# Patient Record
Sex: FEMALE | Race: WHITE | NOT HISPANIC OR LATINO | URBAN - METROPOLITAN AREA
[De-identification: names, ages, dates, MRNs, and addresses within clinical notes are randomized per-mention and may not be internally consistent; named-entity substitution may affect disease eponyms.]

---
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
## 2019-03-25 NOTE — ED PROVIDER NOTE - CONSTITUTIONAL, MLM
-- Message is from the [facility]--    Reason for Call: The patient visiting nurse would li\ke to report that the patient fell on 3/24/19 the patient has a small abrasion to the left knee and is complaining of abdominal pain. She did not find any bruising in the abdominal area. No loss of consciousness, vital signs are stabled right now.    Caller Information       Type Contact Phone    03/25/2019 04:41 PM Phone (Incoming) Jessie (Nurse) [phone]          Alternative phone number:     Turnaround time given to caller:   \"This message will be sent to [state Provider's name]. The clinical team will fulfill your request as soon as they review your message.\"     normal... Well appearing, well nourished, awake, alert, oriented to person, place, time/situation and in no apparent distress.

## 2019-10-17 ENCOUNTER — APPOINTMENT (OUTPATIENT)
Dept: OBGYN | Facility: CLINIC | Age: 19
End: 2019-10-17
Payer: MEDICAID

## 2019-10-17 PROCEDURE — 0502F SUBSEQUENT PRENATAL CARE: CPT

## 2019-11-11 ENCOUNTER — APPOINTMENT (OUTPATIENT)
Dept: OBGYN | Facility: CLINIC | Age: 19
End: 2019-11-11
Payer: MEDICAID

## 2019-11-11 PROCEDURE — 0502F SUBSEQUENT PRENATAL CARE: CPT

## 2019-11-21 ENCOUNTER — APPOINTMENT (OUTPATIENT)
Dept: ANTEPARTUM | Facility: CLINIC | Age: 19
End: 2019-11-21
Payer: MEDICAID

## 2019-11-21 PROCEDURE — 76819 FETAL BIOPHYS PROFIL W/O NST: CPT

## 2019-11-21 PROCEDURE — 76816 OB US FOLLOW-UP PER FETUS: CPT

## 2019-12-05 ENCOUNTER — APPOINTMENT (OUTPATIENT)
Dept: OBGYN | Facility: CLINIC | Age: 19
End: 2019-12-05
Payer: MEDICAID

## 2019-12-05 PROCEDURE — 0502F SUBSEQUENT PRENATAL CARE: CPT

## 2019-12-19 ENCOUNTER — APPOINTMENT (OUTPATIENT)
Dept: OBGYN | Facility: CLINIC | Age: 19
End: 2019-12-19
Payer: MEDICAID

## 2019-12-19 PROCEDURE — 0502F SUBSEQUENT PRENATAL CARE: CPT

## 2020-01-02 ENCOUNTER — APPOINTMENT (OUTPATIENT)
Dept: OBGYN | Facility: CLINIC | Age: 20
End: 2020-01-02
Payer: MEDICAID

## 2020-01-02 PROCEDURE — 0502F SUBSEQUENT PRENATAL CARE: CPT

## 2020-01-09 ENCOUNTER — APPOINTMENT (OUTPATIENT)
Dept: OBGYN | Facility: CLINIC | Age: 20
End: 2020-01-09

## 2020-01-09 ENCOUNTER — APPOINTMENT (OUTPATIENT)
Dept: OBGYN | Facility: CLINIC | Age: 20
End: 2020-01-09
Payer: MEDICAID

## 2020-01-09 PROCEDURE — 0502F SUBSEQUENT PRENATAL CARE: CPT

## 2020-01-13 ENCOUNTER — APPOINTMENT (OUTPATIENT)
Dept: ANTEPARTUM | Facility: CLINIC | Age: 20
End: 2020-01-13

## 2020-01-21 ENCOUNTER — APPOINTMENT (OUTPATIENT)
Dept: ANTEPARTUM | Facility: CLINIC | Age: 20
End: 2020-01-21
Payer: MEDICAID

## 2020-01-21 PROCEDURE — 76816 OB US FOLLOW-UP PER FETUS: CPT

## 2020-01-21 PROCEDURE — 76819 FETAL BIOPHYS PROFIL W/O NST: CPT

## 2020-01-28 ENCOUNTER — APPOINTMENT (OUTPATIENT)
Dept: OBGYN | Facility: CLINIC | Age: 20
End: 2020-01-28

## 2020-02-03 ENCOUNTER — APPOINTMENT (OUTPATIENT)
Dept: ANTEPARTUM | Facility: CLINIC | Age: 20
End: 2020-02-03
Payer: MEDICAID

## 2020-02-03 PROCEDURE — 76816 OB US FOLLOW-UP PER FETUS: CPT

## 2020-02-03 PROCEDURE — 76819 FETAL BIOPHYS PROFIL W/O NST: CPT

## 2020-02-03 PROCEDURE — 76820 UMBILICAL ARTERY ECHO: CPT

## 2020-02-05 ENCOUNTER — OUTPATIENT (OUTPATIENT)
Dept: INPATIENT UNIT | Facility: HOSPITAL | Age: 20
LOS: 1 days | Discharge: HOME | End: 2020-02-05
Payer: MEDICAID

## 2020-02-05 VITALS
TEMPERATURE: 98 F | DIASTOLIC BLOOD PRESSURE: 61 MMHG | HEART RATE: 88 BPM | RESPIRATION RATE: 16 BRPM | SYSTOLIC BLOOD PRESSURE: 94 MMHG

## 2020-02-05 VITALS — HEIGHT: 69 IN | WEIGHT: 171.96 LBS | TEMPERATURE: 98 F

## 2020-02-05 DIAGNOSIS — Z3A.00 WEEKS OF GESTATION OF PREGNANCY NOT SPECIFIED: ICD-10-CM

## 2020-02-05 DIAGNOSIS — O26.899 OTHER SPECIFIED PREGNANCY RELATED CONDITIONS, UNSPECIFIED TRIMESTER: ICD-10-CM

## 2020-02-05 PROCEDURE — 99203 OFFICE O/P NEW LOW 30 MIN: CPT

## 2020-02-05 PROCEDURE — 76818 FETAL BIOPHYS PROFILE W/NST: CPT | Mod: 26

## 2020-02-05 NOTE — OB PROVIDER TRIAGE NOTE - NSHPPHYSICALEXAM_GEN_ALL_CORE
20 y/o  female, para 0000 @ 41+4 wks gestation, single IUP  Early labor  Gen: NAD; A+O x3  Cardiac: R/R/R  Pulm: CTAB  Abdomen: Gravid, soft between ctx, non-tender, mild contractions palpated  VS--BP: 94/61, P 88, RR 18, T 36.8, Pain 5-6/10-Pt declines any pain meds at this time  Cat 1 tracin bpm, moderate variability, +accels, no decels  [place]: q 2-3 min.  SVE: 1.5/50/-3, intact membranes  GBS negative  Clinical EFW 3700g 20 y/o  female, para 0000 @ 41+4 wks gestation, single IUP  Early labor  Gen: NAD; A+O x3  Cardiac: R/R/R  Pulm: CTAB  Abdomen: Gravid, soft between ctx, non-tender, mild contractions palpated  VS--BP: 94/61, P 88, RR 18, T 36.8, Pain 5-6/10-Pt declines any pain meds at this time  Cat 1 tracin bpm, moderate variability, +accels, no decels  [place]: q 2-3 min.  SVE: 1.5/50/-3, intact membranes  GBS negative  Clinical EFW 3700g  Limited TAS: Cephalic, posterior placenta, BPP 8/8, MICHAEL 10.5cm

## 2020-02-05 NOTE — OB PROVIDER TRIAGE NOTE - ADDITIONAL INSTRUCTIONS
Patient to return tomorrow 2/6/20 for scheduled induction of labor  Continue fetal kick counts  Return to triage for: decreased fetal movement, leakage of fluid, vaginal bleeding, increase in contraction frequency or intensity, or for any other concerns.

## 2020-02-05 NOTE — OB PROVIDER TRIAGE NOTE - HISTORY OF PRESENT ILLNESS
Patient of Dr Crandall  20 y/o  female, para 0000 @ 41+4 wks gestation, single IUP  Presents with c/o loss of mucus plug yesterday morning with dark brown/ pinkish discharge and irregular contractions q 8 min since .   Pt endorses strong fetal movement, denies any leakage of fluid or active vaginal bleeding. Pt denies any A/P complications, GBS negative.    Allergies: NKDA  Meds: Prenatal vitamins  PMH: Scoliosis unknown degree of curvature, pt denies outpatient anesthesia consult.   PSH: Denies  OBgynHx    Pt denies any h/o: Ovarian cysts, fibroids, breast problems, STDs/STIs  PSY: Denies  EtOH/smoke/recreational substance use: denies  FH: Not relevant to HPI  H/W/BMI: 69"/172#/25.4

## 2020-02-05 NOTE — OB PROVIDER TRIAGE NOTE - PLAN OF CARE
Patient cleared to be discharge home as per Dr Field  Continue fetal kick counts  Return to triage for: decreased fetal movement, leakage of fluid, vaginal bleeding, increase in contraction frequency or intensity, or for any other concerns.   Labor warning signs reviewed with patient. Patient verbalized understanding.

## 2020-02-05 NOTE — OB RN TRIAGE NOTE - NSNURSINGINSTR_OBGYN_ALL_OB_FT
pt evaluated for labor, no evidence of active labor.   pt d/c to home with written and verbal instructions given by isabel levine.

## 2020-02-05 NOTE — OB PROVIDER TRIAGE NOTE - NSOBPROVIDERNOTE_OBGYN_ALL_OB_FT
Dr Field notified of above findings  Pt cleared to be discharge home as per Dr Field  Pt to keep scheduled induction for tomorrow 2/6/20 unless delivered.

## 2020-02-05 NOTE — OB PROVIDER TRIAGE NOTE - NSHPLABSRESULTS_GEN_ALL_CORE
Vital Signs Last 24 Hrs  T(C): 36.8 (05 Feb 2020 00:47), Max: 36.8 (05 Feb 2020 00:40)  T(F): 98.24 (05 Feb 2020 00:47), Max: 98.24 (05 Feb 2020 00:47)  HR: 88 (05 Feb 2020 00:46) (88 - 88)  BP: 94/61 (05 Feb 2020 00:46) (94/61 - 94/61)  BP(mean): --  RR: 16 (05 Feb 2020 00:40) (16 - 16)  SpO2: --

## 2020-02-06 ENCOUNTER — TRANSCRIPTION ENCOUNTER (OUTPATIENT)
Age: 20
End: 2020-02-06

## 2020-02-06 ENCOUNTER — INPATIENT (INPATIENT)
Facility: HOSPITAL | Age: 20
LOS: 1 days | Discharge: HOME | End: 2020-02-08
Attending: OBSTETRICS & GYNECOLOGY | Admitting: OBSTETRICS & GYNECOLOGY
Payer: MEDICAID

## 2020-02-06 VITALS
HEART RATE: 89 BPM | RESPIRATION RATE: 18 BRPM | DIASTOLIC BLOOD PRESSURE: 58 MMHG | SYSTOLIC BLOOD PRESSURE: 138 MMHG | TEMPERATURE: 99 F

## 2020-02-06 DIAGNOSIS — O26.899 OTHER SPECIFIED PREGNANCY RELATED CONDITIONS, UNSPECIFIED TRIMESTER: ICD-10-CM

## 2020-02-06 DIAGNOSIS — Z3A.00 WEEKS OF GESTATION OF PREGNANCY NOT SPECIFIED: ICD-10-CM

## 2020-02-06 PROBLEM — M41.9 SCOLIOSIS, UNSPECIFIED: Chronic | Status: ACTIVE | Noted: 2020-02-05

## 2020-02-06 LAB
BASOPHILS # BLD AUTO: 0.05 K/UL — SIGNIFICANT CHANGE UP (ref 0–0.2)
BASOPHILS NFR BLD AUTO: 0.3 % — SIGNIFICANT CHANGE UP (ref 0–2)
BLD GP AB SCN SERPL QL: NEGATIVE — SIGNIFICANT CHANGE UP
EOSINOPHIL # BLD AUTO: 0.07 K/UL — SIGNIFICANT CHANGE UP (ref 0–0.5)
EOSINOPHIL NFR BLD AUTO: 0.5 % — SIGNIFICANT CHANGE UP (ref 0–6)
HCT VFR BLD CALC: 43.8 % — SIGNIFICANT CHANGE UP (ref 34.5–45)
HGB BLD-MCNC: 14 G/DL — SIGNIFICANT CHANGE UP (ref 11.5–15.5)
IMM GRANULOCYTES NFR BLD AUTO: 0.4 % — SIGNIFICANT CHANGE UP (ref 0–1.5)
LYMPHOCYTES # BLD AUTO: 18 % — SIGNIFICANT CHANGE UP (ref 13–44)
LYMPHOCYTES # BLD AUTO: 2.59 K/UL — SIGNIFICANT CHANGE UP (ref 1–3.3)
MCHC RBC-ENTMCNC: 27.1 PG — SIGNIFICANT CHANGE UP (ref 27–34)
MCHC RBC-ENTMCNC: 32 % — SIGNIFICANT CHANGE UP (ref 32–36)
MCV RBC AUTO: 84.9 FL — SIGNIFICANT CHANGE UP (ref 80–100)
MONOCYTES # BLD AUTO: 0.9 K/UL — SIGNIFICANT CHANGE UP (ref 0–0.9)
MONOCYTES NFR BLD AUTO: 6.3 % — SIGNIFICANT CHANGE UP (ref 2–14)
NEUTROPHILS # BLD AUTO: 10.72 K/UL — HIGH (ref 1.8–7.4)
NEUTROPHILS NFR BLD AUTO: 74.5 % — SIGNIFICANT CHANGE UP (ref 43–77)
NRBC # FLD: 0 K/UL — SIGNIFICANT CHANGE UP (ref 0–0)
PLATELET # BLD AUTO: 207 K/UL — SIGNIFICANT CHANGE UP (ref 150–400)
PMV BLD: 10.9 FL — SIGNIFICANT CHANGE UP (ref 7–13)
RBC # BLD: 5.16 M/UL — SIGNIFICANT CHANGE UP (ref 3.8–5.2)
RBC # FLD: 14.6 % — HIGH (ref 10.3–14.5)
RH IG SCN BLD-IMP: POSITIVE — SIGNIFICANT CHANGE UP
RH IG SCN BLD-IMP: POSITIVE — SIGNIFICANT CHANGE UP
T PALLIDUM AB TITR SER: NEGATIVE — SIGNIFICANT CHANGE UP
WBC # BLD: 14.39 K/UL — HIGH (ref 3.8–10.5)
WBC # FLD AUTO: 14.39 K/UL — HIGH (ref 3.8–10.5)

## 2020-02-06 PROCEDURE — 59410 OBSTETRICAL CARE: CPT | Mod: U9

## 2020-02-06 RX ORDER — DIBUCAINE 1 %
1 OINTMENT (GRAM) RECTAL EVERY 6 HOURS
Refills: 0 | Status: DISCONTINUED | OUTPATIENT
Start: 2020-02-06 | End: 2020-02-08

## 2020-02-06 RX ORDER — GLYCERIN ADULT
1 SUPPOSITORY, RECTAL RECTAL AT BEDTIME
Refills: 0 | Status: DISCONTINUED | OUTPATIENT
Start: 2020-02-06 | End: 2020-02-08

## 2020-02-06 RX ORDER — IBUPROFEN 200 MG
600 TABLET ORAL EVERY 6 HOURS
Refills: 0 | Status: COMPLETED | OUTPATIENT
Start: 2020-02-06 | End: 2021-01-04

## 2020-02-06 RX ORDER — SIMETHICONE 80 MG/1
80 TABLET, CHEWABLE ORAL EVERY 4 HOURS
Refills: 0 | Status: DISCONTINUED | OUTPATIENT
Start: 2020-02-06 | End: 2020-02-08

## 2020-02-06 RX ORDER — IBUPROFEN 200 MG
600 TABLET ORAL EVERY 6 HOURS
Refills: 0 | Status: DISCONTINUED | OUTPATIENT
Start: 2020-02-06 | End: 2020-02-08

## 2020-02-06 RX ORDER — AER TRAVELER 0.5 G/1
1 SOLUTION RECTAL; TOPICAL EVERY 4 HOURS
Refills: 0 | Status: DISCONTINUED | OUTPATIENT
Start: 2020-02-06 | End: 2020-02-08

## 2020-02-06 RX ORDER — SODIUM CHLORIDE 9 MG/ML
3 INJECTION INTRAMUSCULAR; INTRAVENOUS; SUBCUTANEOUS EVERY 8 HOURS
Refills: 0 | Status: DISCONTINUED | OUTPATIENT
Start: 2020-02-06 | End: 2020-02-08

## 2020-02-06 RX ORDER — OXYCODONE HYDROCHLORIDE 5 MG/1
5 TABLET ORAL
Refills: 0 | Status: DISCONTINUED | OUTPATIENT
Start: 2020-02-06 | End: 2020-02-08

## 2020-02-06 RX ORDER — MAGNESIUM HYDROXIDE 400 MG/1
30 TABLET, CHEWABLE ORAL
Refills: 0 | Status: DISCONTINUED | OUTPATIENT
Start: 2020-02-06 | End: 2020-02-08

## 2020-02-06 RX ORDER — TETANUS TOXOID, REDUCED DIPHTHERIA TOXOID AND ACELLULAR PERTUSSIS VACCINE, ADSORBED 5; 2.5; 8; 8; 2.5 [IU]/.5ML; [IU]/.5ML; UG/.5ML; UG/.5ML; UG/.5ML
0.5 SUSPENSION INTRAMUSCULAR ONCE
Refills: 0 | Status: DISCONTINUED | OUTPATIENT
Start: 2020-02-06 | End: 2020-02-08

## 2020-02-06 RX ORDER — PRAMOXINE HYDROCHLORIDE 150 MG/15G
1 AEROSOL, FOAM RECTAL EVERY 4 HOURS
Refills: 0 | Status: DISCONTINUED | OUTPATIENT
Start: 2020-02-06 | End: 2020-02-08

## 2020-02-06 RX ORDER — HYDROCORTISONE 1 %
1 OINTMENT (GRAM) TOPICAL EVERY 6 HOURS
Refills: 0 | Status: DISCONTINUED | OUTPATIENT
Start: 2020-02-06 | End: 2020-02-08

## 2020-02-06 RX ORDER — DIPHENHYDRAMINE HCL 50 MG
25 CAPSULE ORAL EVERY 6 HOURS
Refills: 0 | Status: DISCONTINUED | OUTPATIENT
Start: 2020-02-06 | End: 2020-02-08

## 2020-02-06 RX ORDER — OXYTOCIN 10 UNIT/ML
333.33 VIAL (ML) INJECTION
Qty: 20 | Refills: 0 | Status: DISCONTINUED | OUTPATIENT
Start: 2020-02-06 | End: 2020-02-06

## 2020-02-06 RX ORDER — OXYCODONE HYDROCHLORIDE 5 MG/1
5 TABLET ORAL ONCE
Refills: 0 | Status: DISCONTINUED | OUTPATIENT
Start: 2020-02-06 | End: 2020-02-08

## 2020-02-06 RX ORDER — LANOLIN
1 OINTMENT (GRAM) TOPICAL EVERY 6 HOURS
Refills: 0 | Status: DISCONTINUED | OUTPATIENT
Start: 2020-02-06 | End: 2020-02-08

## 2020-02-06 RX ORDER — BENZOCAINE 10 %
1 GEL (GRAM) MUCOUS MEMBRANE EVERY 6 HOURS
Refills: 0 | Status: DISCONTINUED | OUTPATIENT
Start: 2020-02-06 | End: 2020-02-08

## 2020-02-06 RX ORDER — OXYTOCIN 10 UNIT/ML
VIAL (ML) INJECTION
Qty: 20 | Refills: 0 | Status: COMPLETED | OUTPATIENT
Start: 2020-02-06 | End: 2020-02-06

## 2020-02-06 RX ORDER — ACETAMINOPHEN 500 MG
975 TABLET ORAL
Refills: 0 | Status: DISCONTINUED | OUTPATIENT
Start: 2020-02-06 | End: 2020-02-08

## 2020-02-06 RX ORDER — KETOROLAC TROMETHAMINE 30 MG/ML
30 SYRINGE (ML) INJECTION ONCE
Refills: 0 | Status: DISCONTINUED | OUTPATIENT
Start: 2020-02-06 | End: 2020-02-06

## 2020-02-06 RX ORDER — SODIUM CHLORIDE 9 MG/ML
1000 INJECTION, SOLUTION INTRAVENOUS
Refills: 0 | Status: DISCONTINUED | OUTPATIENT
Start: 2020-02-06 | End: 2020-02-06

## 2020-02-06 RX ADMIN — SODIUM CHLORIDE 3 MILLILITER(S): 9 INJECTION INTRAMUSCULAR; INTRAVENOUS; SUBCUTANEOUS at 06:22

## 2020-02-06 RX ADMIN — Medication 975 MILLIGRAM(S): at 17:35

## 2020-02-06 RX ADMIN — Medication 1000 MILLIUNIT(S)/MIN: at 06:21

## 2020-02-06 RX ADMIN — Medication 600 MILLIGRAM(S): at 15:09

## 2020-02-06 RX ADMIN — Medication 975 MILLIGRAM(S): at 09:28

## 2020-02-06 RX ADMIN — Medication 600 MILLIGRAM(S): at 22:00

## 2020-02-06 RX ADMIN — Medication 30 MILLIGRAM(S): at 06:21

## 2020-02-06 RX ADMIN — Medication 30 MILLIGRAM(S): at 06:43

## 2020-02-06 RX ADMIN — Medication 1000 MILLIUNIT(S)/MIN: at 09:28

## 2020-02-06 RX ADMIN — SODIUM CHLORIDE 3 MILLILITER(S): 9 INJECTION INTRAMUSCULAR; INTRAVENOUS; SUBCUTANEOUS at 10:22

## 2020-02-06 RX ADMIN — Medication 600 MILLIGRAM(S): at 15:45

## 2020-02-06 RX ADMIN — Medication 600 MILLIGRAM(S): at 21:27

## 2020-02-06 RX ADMIN — Medication 1 TABLET(S): at 12:36

## 2020-02-06 RX ADMIN — Medication 975 MILLIGRAM(S): at 16:57

## 2020-02-06 RX ADMIN — Medication 975 MILLIGRAM(S): at 10:00

## 2020-02-06 NOTE — OB RN DELIVERY SUMMARY - NSDELAYEDCLAMPA_OBGYN_ALL_OB
Outpatient/Observation goals to be met before discharge home:      - Diagnostic tests and consults completed and resulted - No  - Vital signs normal or at patient baseline - No  - Tolerating oral antibiotics or has plans for home infusion setup - Yes  - Infection is improving - No  - Dyspnea improved and O2 sats greater than 88% on room air or prior home oxygen levels - Pending  - Returns to baseline functional status - No  - Safe disposition plan has been identified - Yes         Yes

## 2020-02-06 NOTE — OB PROVIDER TRIAGE NOTE - HISTORY OF PRESENT ILLNESS
19 yr old  @ 41.5 wks, presents with LOF @ 11 pm with CTX, Denies VB. Reports positive fetal movement. GBS negative, EFW 8.6 lbs  PNI: denies  Medhx: denies  Surghx: denies  Gynhx: denies

## 2020-02-06 NOTE — OB PROVIDER TRIAGE NOTE - NSHPPHYSICALEXAM_GEN_ALL_CORE
VS: 129/84  TOCO: ctx irregular  NST: , +accels, -decels, moderate variability, in progress  VE: grossly ruptured, clear, 2/60/-3, vertex

## 2020-02-06 NOTE — DISCHARGE NOTE OB - MATERIALS PROVIDED
[facility] [place] Screening Program/Breastfeeding Log/Breastfeeding Mother’s Support Group Information/Guide to Postpartum Care/Tdap Vaccination (VIS Pub Date: 2012)/Back To Sleep Handout/Shaken Baby Prevention Handout/Breastfeeding Guide and Packet/[place]  Immunization Record/Vaccinations/[facility] Hearing Screen Program

## 2020-02-06 NOTE — OB PROVIDER DELIVERY SUMMARY - NSPROVIDERDELIVERYNOTE_OBGYN_ALL_OB_FT
of liveborn male infant in OA position, weight 9lb 5oz, APGARs 8/9. Head, shoulders and torso were delivered easily. The infant was suctioned and handed to patient. The cord was clamped and cut and the infant was taken to the warmer for assessment. Gentle cord traction and fundal massage resulted in spontaneous delivery of an intact placenta. A segment was then clamped and cut for gases. Pitocin was started, uterine fundus was firm. Visualization of vaginal vault showed a second degree laceration repaired using a 2.0 chromic suture. Hemostasis was achieved.  Sponge count was correct. Patient cleaned and stable.     Jose Arriaga PGY1

## 2020-02-06 NOTE — LACTATION INITIAL EVALUATION - INTERVENTION OUTCOME
nbn demonstrated  deep latch and  performed  with sucking and swallowing  noted  .  offered assistance  as needed  .  rn aware  of  visit./verbalizes understanding

## 2020-02-06 NOTE — DISCHARGE NOTE OB - CARE PROVIDER_API CALL
Naveen Crandall)  MaternalFetal Medicine; Obstetrics and Gynecology  [address] [place] [id number]  Phone: [phone]  Fax: [phone]  Follow Up Time:

## 2020-02-06 NOTE — OB RN PATIENT PROFILE - EDUCATION ON THE POTENTIAL RISKS AND IMPACT OF EARLY USE OF PACIFIERS ON THE ESTABLISHMENT OF BREASTFEEDING
Nursing Note by Jami Plata NCMA at 10/16/18 08:49 AM     Author:  Jami Plata NCMA Service:  (none) Author Type:  Medical Assistant     Filed:  10/16/18 08:49 AM Encounter Date:  10/16/2018 Status:  Signed     :  Jami Plata NCMA (Medical Assistant)            If provider orders tests at today's visit, patient would like to be contacted via [employer].  If to contact patient by phone, patient's preferred phone # is [phone] (cell) and it is ok to leave a detailed message on voice mail or with family member.  If medications are ordered at today's visit, the pharmacy name/location patient would like them to be sent to is [facility] [address][email or web address]]         Revision History        User Key Date/Time User Provider Type Action    > JW1.1 10/16/18 08:49 AM Jami Plata NCMA Medical Assistant Sign    T - Template            
Statement Selected

## 2020-02-06 NOTE — CHART NOTE - NSCHARTNOTEFT_GEN_A_CORE
PGY1 Note      Patient evaluated at bedside for cervical change, comfortable with epidural.       VS  T(C): 36.8 (02-06-20 @ 01:54)  HR: 94 (02-06-20 @ 02:31)  BP: 92/54 (02-06-20 @ 02:31)  RR: 18 (02-06-20 @ 00:35)  SpO2: 99% (02-06-20 @ 02:29)    SVE: 3.5/90/-3  FHR: 150/mod wang/+acel/-decel: cat 1  [place]: ctx q 2-3 mins      Continue with expt management    d/w Dr. Randy Arriaga PGY1

## 2020-02-06 NOTE — LACTATION INITIAL EVALUATION - LACTATION INTERVENTIONS
initiate skin to skin/initiate hand expression routine/afdl  discussed  signs  of  effective  feeding and  swallowing.  discussed  compression at  breast when  nbn  stops  drinking  and  is  still sucking.  instructed  to offer both  breast at a feeding ,feed on cue and safe  skin to skin.   reviewed strategies to bring  out  nipple .

## 2020-02-06 NOTE — DISCHARGE NOTE OB - MEDICATION SUMMARY - MEDICATIONS TO TAKE
I will START or STAY ON the medications listed below when I get home from the hospital:  None I will START or STAY ON the medications listed below when I get home from the hospital:    acetaminophen 325 mg oral tablet  -- 3 tab(s) by mouth every 6 hours, As Needed  -- Indication: For Pain    ibuprofen 600 mg oral tablet  -- 1 tab(s) by mouth every 6 hours, As Needed  -- Indication: For Pain    Prenatal Multivitamins with Folic Acid 1 mg oral tablet  -- 1 tab(s) by mouth once a day  -- Indication: For Vitamin

## 2020-02-06 NOTE — OB RN DELIVERY SUMMARY - NS_SEPSISRSKCALC_OBGYN_ALL_OB_FT
EOS calculated successfully. EOS Risk Factor: 0.5/1000 live births ([facility] national incidence); GA=41w5d; Temp=99.32; ROM=5.317; GBS='Negative'; Antibiotics='No antibiotics or any antibiotics < 2 hrs prior to birth'

## 2020-02-06 NOTE — OB PROVIDER TRIAGE NOTE - NSOBPROVIDERNOTE_OBGYN_ALL_OB_FT
19 yr old  @ 41.5 wks, presents with LOF @ 11 pm with CTX, Denies VB. Reports positive fetal movement. GBS negative, EFW 8.6 lbs  PNI: denies  Medhx: denies  Surghx: denies  Gynhx: denies    VS: 129/84  TOCO: ctx irregular  NST: , +accels, -decels, moderate variability, in progress  VE: grossly ruptured, clear, 2/60/-3, vertex    a/p  19 yr old  @ 41.5 wks. ROM clear  Admitted  Expectant management x 2-4 hr  Epidural  GBS negative  Discussed with Dr. Padilla

## 2020-02-06 NOTE — DISCHARGE NOTE OB - PATIENT PORTAL LINK FT
You can access the FollowMyHealth Patient Portal offered by [facility] by registering at the following website: [email or web address]. By joining [facility]’s FollowMyHealth portal, you will also be able to view your health information using other applications (apps) compatible with our system.

## 2020-02-07 RX ADMIN — SODIUM CHLORIDE 3 MILLILITER(S): 9 INJECTION INTRAMUSCULAR; INTRAVENOUS; SUBCUTANEOUS at 00:00

## 2020-02-07 RX ADMIN — Medication 600 MILLIGRAM(S): at 10:00

## 2020-02-07 RX ADMIN — Medication 600 MILLIGRAM(S): at 22:00

## 2020-02-07 RX ADMIN — Medication 975 MILLIGRAM(S): at 14:00

## 2020-02-07 RX ADMIN — Medication 600 MILLIGRAM(S): at 17:15

## 2020-02-07 RX ADMIN — Medication 600 MILLIGRAM(S): at 16:32

## 2020-02-07 RX ADMIN — Medication 975 MILLIGRAM(S): at 21:00

## 2020-02-07 RX ADMIN — Medication 975 MILLIGRAM(S): at 13:22

## 2020-02-07 RX ADMIN — Medication 975 MILLIGRAM(S): at 05:51

## 2020-02-07 RX ADMIN — Medication 975 MILLIGRAM(S): at 01:00

## 2020-02-07 RX ADMIN — SODIUM CHLORIDE 3 MILLILITER(S): 9 INJECTION INTRAMUSCULAR; INTRAVENOUS; SUBCUTANEOUS at 04:51

## 2020-02-07 RX ADMIN — Medication 1 TABLET(S): at 13:22

## 2020-02-07 RX ADMIN — Medication 600 MILLIGRAM(S): at 23:00

## 2020-02-07 RX ADMIN — Medication 975 MILLIGRAM(S): at 20:20

## 2020-02-07 RX ADMIN — Medication 600 MILLIGRAM(S): at 09:21

## 2020-02-07 RX ADMIN — Medication 975 MILLIGRAM(S): at 00:30

## 2020-02-07 RX ADMIN — Medication 975 MILLIGRAM(S): at 06:52

## 2020-02-08 VITALS
TEMPERATURE: 98 F | HEART RATE: 65 BPM | OXYGEN SATURATION: 99 % | DIASTOLIC BLOOD PRESSURE: 68 MMHG | SYSTOLIC BLOOD PRESSURE: 112 MMHG | RESPIRATION RATE: 16 BRPM

## 2020-02-08 RX ORDER — ACETAMINOPHEN 500 MG
3 TABLET ORAL
Qty: 0 | Refills: 0 | DISCHARGE
Start: 2020-02-08

## 2020-02-08 RX ORDER — IBUPROFEN 200 MG
1 TABLET ORAL
Qty: 0 | Refills: 0 | DISCHARGE
Start: 2020-02-08

## 2020-02-08 RX ORDER — CALCIUM CARBONATE 500(1250)
1 TABLET ORAL ONCE
Refills: 0 | Status: COMPLETED | OUTPATIENT
Start: 2020-02-08 | End: 2020-02-08

## 2020-02-08 RX ADMIN — Medication 1 TABLET(S): at 01:10

## 2020-02-08 RX ADMIN — Medication 600 MILLIGRAM(S): at 06:45

## 2020-02-08 RX ADMIN — Medication 975 MILLIGRAM(S): at 15:44

## 2020-02-08 RX ADMIN — Medication 600 MILLIGRAM(S): at 18:04

## 2020-02-08 RX ADMIN — Medication 975 MILLIGRAM(S): at 15:09

## 2020-02-08 RX ADMIN — Medication 1 TABLET(S): at 12:27

## 2020-02-08 RX ADMIN — Medication 600 MILLIGRAM(S): at 12:27

## 2020-02-08 RX ADMIN — Medication 600 MILLIGRAM(S): at 13:05

## 2020-02-08 RX ADMIN — Medication 600 MILLIGRAM(S): at 07:15

## 2020-02-08 RX ADMIN — Medication 975 MILLIGRAM(S): at 10:10

## 2020-02-08 RX ADMIN — Medication 600 MILLIGRAM(S): at 18:40

## 2020-02-08 RX ADMIN — Medication 975 MILLIGRAM(S): at 09:28

## 2020-02-26 PROBLEM — Z00.00 ENCOUNTER FOR PREVENTIVE HEALTH EXAMINATION: Noted: 2020-02-26

## 2020-03-19 ENCOUNTER — APPOINTMENT (OUTPATIENT)
Dept: OBGYN | Facility: CLINIC | Age: 20
End: 2020-03-19

## 2020-08-24 ENCOUNTER — APPOINTMENT (OUTPATIENT)
Dept: OBGYN | Facility: CLINIC | Age: 20
End: 2020-08-24
Payer: MEDICAID

## 2020-08-24 PROCEDURE — 36415 COLL VENOUS BLD VENIPUNCTURE: CPT

## 2021-04-09 ENCOUNTER — APPOINTMENT (OUTPATIENT)
Dept: OBGYN | Facility: CLINIC | Age: 21
End: 2021-04-09
Payer: MEDICAID

## 2021-04-09 PROCEDURE — 99072 ADDL SUPL MATRL&STAF TM PHE: CPT

## 2021-04-09 PROCEDURE — 99395 PREV VISIT EST AGE 18-39: CPT

## 2021-05-19 ENCOUNTER — APPOINTMENT (OUTPATIENT)
Dept: OBGYN | Facility: CLINIC | Age: 21
End: 2021-05-19
Payer: MEDICAID

## 2021-05-19 ENCOUNTER — NON-APPOINTMENT (OUTPATIENT)
Age: 21
End: 2021-05-19

## 2021-05-19 PROCEDURE — ZZZZZ: CPT

## 2021-07-01 ENCOUNTER — APPOINTMENT (OUTPATIENT)
Dept: HUMAN REPRODUCTION | Facility: CLINIC | Age: 21
End: 2021-07-01
Payer: SELF-PAY

## 2021-07-01 PROCEDURE — 99204 OFFICE O/P NEW MOD 45 MIN: CPT | Mod: 25

## 2021-07-01 PROCEDURE — 36415 COLL VENOUS BLD VENIPUNCTURE: CPT

## 2021-07-01 PROCEDURE — 76830 TRANSVAGINAL US NON-OB: CPT

## 2021-08-03 ENCOUNTER — APPOINTMENT (OUTPATIENT)
Dept: HUMAN REPRODUCTION | Facility: CLINIC | Age: 21
End: 2021-08-03
Payer: SELF-PAY

## 2021-08-03 PROCEDURE — 99214 OFFICE O/P EST MOD 30 MIN: CPT

## 2021-11-04 ENCOUNTER — APPOINTMENT (OUTPATIENT)
Dept: HUMAN REPRODUCTION | Facility: CLINIC | Age: 21
End: 2021-11-04
Payer: SELF-PAY

## 2021-11-04 ENCOUNTER — APPOINTMENT (OUTPATIENT)
Dept: HUMAN REPRODUCTION | Facility: CLINIC | Age: 21
End: 2021-11-04

## 2021-11-04 PROCEDURE — 99215 OFFICE O/P EST HI 40 MIN: CPT | Mod: 95

## 2021-12-02 ENCOUNTER — APPOINTMENT (OUTPATIENT)
Dept: HUMAN REPRODUCTION | Facility: CLINIC | Age: 21
End: 2021-12-02
Payer: SELF-PAY

## 2021-12-02 PROCEDURE — 76830 TRANSVAGINAL US NON-OB: CPT

## 2021-12-02 PROCEDURE — 36415 COLL VENOUS BLD VENIPUNCTURE: CPT

## 2021-12-02 PROCEDURE — 84144 ASSAY OF PROGESTERONE: CPT

## 2021-12-02 PROCEDURE — 83002 ASSAY OF GONADOTROPIN (LH): CPT | Mod: QW

## 2021-12-02 PROCEDURE — 84702 CHORIONIC GONADOTROPIN TEST: CPT

## 2021-12-02 PROCEDURE — 99213 OFFICE O/P EST LOW 20 MIN: CPT | Mod: 25

## 2021-12-02 PROCEDURE — 82670 ASSAY OF TOTAL ESTRADIOL: CPT

## 2021-12-06 ENCOUNTER — APPOINTMENT (OUTPATIENT)
Dept: HUMAN REPRODUCTION | Facility: CLINIC | Age: 21
End: 2021-12-06
Payer: SELF-PAY

## 2021-12-06 PROCEDURE — 84144 ASSAY OF PROGESTERONE: CPT

## 2021-12-06 PROCEDURE — 82670 ASSAY OF TOTAL ESTRADIOL: CPT

## 2021-12-06 PROCEDURE — 99213 OFFICE O/P EST LOW 20 MIN: CPT | Mod: 25

## 2021-12-06 PROCEDURE — 36415 COLL VENOUS BLD VENIPUNCTURE: CPT

## 2021-12-06 PROCEDURE — 83002 ASSAY OF GONADOTROPIN (LH): CPT | Mod: QW

## 2021-12-06 PROCEDURE — 76830 TRANSVAGINAL US NON-OB: CPT

## 2021-12-13 ENCOUNTER — APPOINTMENT (OUTPATIENT)
Dept: HUMAN REPRODUCTION | Facility: CLINIC | Age: 21
End: 2021-12-13
Payer: SELF-PAY

## 2021-12-13 PROCEDURE — 76830 TRANSVAGINAL US NON-OB: CPT

## 2021-12-13 PROCEDURE — 36415 COLL VENOUS BLD VENIPUNCTURE: CPT

## 2021-12-13 PROCEDURE — 99213 OFFICE O/P EST LOW 20 MIN: CPT | Mod: 25

## 2021-12-14 ENCOUNTER — APPOINTMENT (OUTPATIENT)
Dept: HUMAN REPRODUCTION | Facility: CLINIC | Age: 21
End: 2021-12-14
Payer: SELF-PAY

## 2021-12-14 PROCEDURE — 89260 SPERM ISOLATION SIMPLE: CPT

## 2021-12-14 PROCEDURE — 58322 ARTIFICIAL INSEMINATION: CPT

## 2021-12-14 PROCEDURE — 99212 OFFICE O/P EST SF 10 MIN: CPT | Mod: 25

## 2021-12-28 ENCOUNTER — APPOINTMENT (OUTPATIENT)
Dept: HUMAN REPRODUCTION | Facility: CLINIC | Age: 21
End: 2021-12-28

## 2022-01-07 ENCOUNTER — APPOINTMENT (OUTPATIENT)
Dept: HUMAN REPRODUCTION | Facility: CLINIC | Age: 22
End: 2022-01-07
Payer: SELF-PAY

## 2022-01-07 PROCEDURE — 76830 TRANSVAGINAL US NON-OB: CPT

## 2022-01-07 PROCEDURE — 99213 OFFICE O/P EST LOW 20 MIN: CPT | Mod: 25

## 2022-01-07 PROCEDURE — 36415 COLL VENOUS BLD VENIPUNCTURE: CPT

## 2022-01-07 PROCEDURE — 82670 ASSAY OF TOTAL ESTRADIOL: CPT

## 2022-01-07 PROCEDURE — 83002 ASSAY OF GONADOTROPIN (LH): CPT | Mod: QW

## 2022-01-10 ENCOUNTER — APPOINTMENT (OUTPATIENT)
Dept: HUMAN REPRODUCTION | Facility: CLINIC | Age: 22
End: 2022-01-10
Payer: SELF-PAY

## 2022-01-10 PROCEDURE — 58322 ARTIFICIAL INSEMINATION: CPT

## 2022-01-10 PROCEDURE — 89260 SPERM ISOLATION SIMPLE: CPT

## 2022-01-10 PROCEDURE — 99212 OFFICE O/P EST SF 10 MIN: CPT

## 2022-02-03 ENCOUNTER — APPOINTMENT (OUTPATIENT)
Dept: HUMAN REPRODUCTION | Facility: CLINIC | Age: 22
End: 2022-02-03
Payer: SELF-PAY

## 2022-02-03 PROCEDURE — 76831 ECHO EXAM UTERUS: CPT

## 2022-02-03 PROCEDURE — 76830 TRANSVAGINAL US NON-OB: CPT

## 2022-02-03 PROCEDURE — 36415 COLL VENOUS BLD VENIPUNCTURE: CPT

## 2022-02-03 PROCEDURE — 99072 ADDL SUPL MATRL&STAF TM PHE: CPT

## 2022-02-03 PROCEDURE — 99213 OFFICE O/P EST LOW 20 MIN: CPT | Mod: 25

## 2022-02-03 PROCEDURE — 58340 CATHETER FOR HYSTEROGRAPHY: CPT

## 2022-02-07 ENCOUNTER — APPOINTMENT (OUTPATIENT)
Dept: HUMAN REPRODUCTION | Facility: CLINIC | Age: 22
End: 2022-02-07
Payer: SELF-PAY

## 2022-02-07 PROCEDURE — 99072 ADDL SUPL MATRL&STAF TM PHE: CPT

## 2022-02-07 PROCEDURE — 58322 ARTIFICIAL INSEMINATION: CPT

## 2022-02-07 PROCEDURE — 89261 SPERM ISOLATION COMPLEX: CPT

## 2022-03-01 ENCOUNTER — APPOINTMENT (OUTPATIENT)
Dept: HUMAN REPRODUCTION | Facility: CLINIC | Age: 22
End: 2022-03-01

## 2022-04-29 ENCOUNTER — APPOINTMENT (OUTPATIENT)
Dept: HUMAN REPRODUCTION | Facility: CLINIC | Age: 22
End: 2022-04-29
Payer: SELF-PAY

## 2022-04-29 PROCEDURE — 83002 ASSAY OF GONADOTROPIN (LH): CPT | Mod: QW

## 2022-04-29 PROCEDURE — 76830 TRANSVAGINAL US NON-OB: CPT

## 2022-04-29 PROCEDURE — 36415 COLL VENOUS BLD VENIPUNCTURE: CPT

## 2022-04-29 PROCEDURE — 99213 OFFICE O/P EST LOW 20 MIN: CPT | Mod: 25

## 2022-04-29 PROCEDURE — 82670 ASSAY OF TOTAL ESTRADIOL: CPT

## 2022-05-02 ENCOUNTER — APPOINTMENT (OUTPATIENT)
Dept: HUMAN REPRODUCTION | Facility: CLINIC | Age: 22
End: 2022-05-02
Payer: SELF-PAY

## 2022-05-02 PROCEDURE — 83002 ASSAY OF GONADOTROPIN (LH): CPT | Mod: QW

## 2022-05-02 PROCEDURE — 36415 COLL VENOUS BLD VENIPUNCTURE: CPT

## 2022-05-02 PROCEDURE — 82670 ASSAY OF TOTAL ESTRADIOL: CPT

## 2022-05-02 PROCEDURE — 99213 OFFICE O/P EST LOW 20 MIN: CPT | Mod: 25

## 2022-05-02 PROCEDURE — 76830 TRANSVAGINAL US NON-OB: CPT

## 2022-05-03 ENCOUNTER — APPOINTMENT (OUTPATIENT)
Dept: HUMAN REPRODUCTION | Facility: CLINIC | Age: 22
End: 2022-05-03
Payer: SELF-PAY

## 2022-05-03 PROCEDURE — 99214 OFFICE O/P EST MOD 30 MIN: CPT | Mod: 95

## 2022-05-04 ENCOUNTER — APPOINTMENT (OUTPATIENT)
Dept: HUMAN REPRODUCTION | Facility: CLINIC | Age: 22
End: 2022-05-04
Payer: SELF-PAY

## 2022-05-04 PROCEDURE — 36415 COLL VENOUS BLD VENIPUNCTURE: CPT

## 2022-05-04 PROCEDURE — 76830 TRANSVAGINAL US NON-OB: CPT

## 2022-05-04 PROCEDURE — 83002 ASSAY OF GONADOTROPIN (LH): CPT | Mod: QW

## 2022-05-04 PROCEDURE — 99213 OFFICE O/P EST LOW 20 MIN: CPT | Mod: 25

## 2022-05-04 PROCEDURE — 82670 ASSAY OF TOTAL ESTRADIOL: CPT

## 2022-05-05 ENCOUNTER — APPOINTMENT (OUTPATIENT)
Dept: HUMAN REPRODUCTION | Facility: CLINIC | Age: 22
End: 2022-05-05
Payer: SELF-PAY

## 2022-05-05 PROCEDURE — 36415 COLL VENOUS BLD VENIPUNCTURE: CPT

## 2022-05-05 PROCEDURE — 89261 SPERM ISOLATION COMPLEX: CPT

## 2022-05-05 PROCEDURE — 58322 ARTIFICIAL INSEMINATION: CPT

## 2022-05-19 ENCOUNTER — APPOINTMENT (OUTPATIENT)
Dept: HUMAN REPRODUCTION | Facility: CLINIC | Age: 22
End: 2022-05-19

## 2022-06-03 ENCOUNTER — APPOINTMENT (OUTPATIENT)
Dept: HUMAN REPRODUCTION | Facility: CLINIC | Age: 22
End: 2022-06-03
Payer: SELF-PAY

## 2022-06-03 PROCEDURE — 99213 OFFICE O/P EST LOW 20 MIN: CPT | Mod: 25

## 2022-06-03 PROCEDURE — 84702 CHORIONIC GONADOTROPIN TEST: CPT

## 2022-06-03 PROCEDURE — 83001 ASSAY OF GONADOTROPIN (FSH): CPT | Mod: QW

## 2022-06-03 PROCEDURE — 36415 COLL VENOUS BLD VENIPUNCTURE: CPT

## 2022-06-03 PROCEDURE — 82670 ASSAY OF TOTAL ESTRADIOL: CPT

## 2022-06-03 PROCEDURE — 76830 TRANSVAGINAL US NON-OB: CPT

## 2022-06-03 PROCEDURE — 99072 ADDL SUPL MATRL&STAF TM PHE: CPT

## 2022-06-07 ENCOUNTER — APPOINTMENT (OUTPATIENT)
Dept: HUMAN REPRODUCTION | Facility: CLINIC | Age: 22
End: 2022-06-07
Payer: COMMERCIAL

## 2022-06-07 PROCEDURE — 76830 TRANSVAGINAL US NON-OB: CPT

## 2022-06-07 PROCEDURE — 36415 COLL VENOUS BLD VENIPUNCTURE: CPT

## 2022-06-07 PROCEDURE — 99072 ADDL SUPL MATRL&STAF TM PHE: CPT

## 2022-06-07 PROCEDURE — 82670 ASSAY OF TOTAL ESTRADIOL: CPT

## 2022-06-07 PROCEDURE — 99213 OFFICE O/P EST LOW 20 MIN: CPT | Mod: 25

## 2022-06-08 ENCOUNTER — APPOINTMENT (OUTPATIENT)
Dept: HUMAN REPRODUCTION | Facility: CLINIC | Age: 22
End: 2022-06-08

## 2022-06-08 ENCOUNTER — APPOINTMENT (OUTPATIENT)
Dept: HUMAN REPRODUCTION | Facility: CLINIC | Age: 22
End: 2022-06-08
Payer: COMMERCIAL

## 2022-06-08 PROCEDURE — 99213Y: CUSTOM | Mod: 25

## 2022-06-08 PROCEDURE — 76830 TRANSVAGINAL US NON-OB: CPT

## 2022-06-08 PROCEDURE — 36415 COLL VENOUS BLD VENIPUNCTURE: CPT

## 2022-06-08 PROCEDURE — 99072 ADDL SUPL MATRL&STAF TM PHE: CPT

## 2022-06-10 ENCOUNTER — APPOINTMENT (OUTPATIENT)
Dept: HUMAN REPRODUCTION | Facility: CLINIC | Age: 22
End: 2022-06-10
Payer: COMMERCIAL

## 2022-06-10 ENCOUNTER — APPOINTMENT (OUTPATIENT)
Dept: HUMAN REPRODUCTION | Facility: CLINIC | Age: 22
End: 2022-06-10

## 2022-06-10 PROCEDURE — 99072 ADDL SUPL MATRL&STAF TM PHE: CPT

## 2022-06-10 PROCEDURE — 99213Y: CUSTOM | Mod: 25

## 2022-06-10 PROCEDURE — 36415 COLL VENOUS BLD VENIPUNCTURE: CPT

## 2022-06-10 PROCEDURE — 76830 TRANSVAGINAL US NON-OB: CPT

## 2022-06-12 ENCOUNTER — APPOINTMENT (OUTPATIENT)
Dept: HUMAN REPRODUCTION | Facility: CLINIC | Age: 22
End: 2022-06-12
Payer: COMMERCIAL

## 2022-06-12 ENCOUNTER — APPOINTMENT (OUTPATIENT)
Dept: HUMAN REPRODUCTION | Facility: CLINIC | Age: 22
End: 2022-06-12

## 2022-06-12 PROCEDURE — 36415 COLL VENOUS BLD VENIPUNCTURE: CPT

## 2022-06-12 PROCEDURE — 76830 TRANSVAGINAL US NON-OB: CPT

## 2022-06-12 PROCEDURE — 99213 OFFICE O/P EST LOW 20 MIN: CPT | Mod: 25

## 2022-06-12 PROCEDURE — 99072 ADDL SUPL MATRL&STAF TM PHE: CPT

## 2022-06-14 ENCOUNTER — APPOINTMENT (OUTPATIENT)
Dept: HUMAN REPRODUCTION | Facility: CLINIC | Age: 22
End: 2022-06-14

## 2022-06-14 ENCOUNTER — APPOINTMENT (OUTPATIENT)
Dept: HUMAN REPRODUCTION | Facility: CLINIC | Age: 22
End: 2022-06-14
Payer: COMMERCIAL

## 2022-06-14 PROCEDURE — 99072 ADDL SUPL MATRL&STAF TM PHE: CPT

## 2022-06-14 PROCEDURE — 99213Y: CUSTOM | Mod: 25

## 2022-06-14 PROCEDURE — 36415 COLL VENOUS BLD VENIPUNCTURE: CPT

## 2022-06-14 PROCEDURE — 76830 TRANSVAGINAL US NON-OB: CPT

## 2022-06-15 ENCOUNTER — APPOINTMENT (OUTPATIENT)
Dept: HUMAN REPRODUCTION | Facility: CLINIC | Age: 22
End: 2022-06-15
Payer: COMMERCIAL

## 2022-06-15 ENCOUNTER — APPOINTMENT (OUTPATIENT)
Dept: HUMAN REPRODUCTION | Facility: CLINIC | Age: 22
End: 2022-06-15

## 2022-06-15 PROCEDURE — 99072 ADDL SUPL MATRL&STAF TM PHE: CPT

## 2022-06-15 PROCEDURE — 76830 TRANSVAGINAL US NON-OB: CPT

## 2022-06-15 PROCEDURE — 36415 COLL VENOUS BLD VENIPUNCTURE: CPT

## 2022-06-15 PROCEDURE — 99213Y: CUSTOM | Mod: 25

## 2022-06-16 ENCOUNTER — APPOINTMENT (OUTPATIENT)
Dept: HUMAN REPRODUCTION | Facility: CLINIC | Age: 22
End: 2022-06-16
Payer: COMMERCIAL

## 2022-06-16 PROCEDURE — 89281 ASSIST OOCYTE FERTILIZATION: CPT

## 2022-06-16 PROCEDURE — 89250 CULTR OOCYTE/EMBRYO <4 DAYS: CPT

## 2022-06-16 PROCEDURE — 99072 ADDL SUPL MATRL&STAF TM PHE: CPT

## 2022-06-16 PROCEDURE — 89254 OOCYTE IDENTIFICATION: CPT

## 2022-06-16 PROCEDURE — 76948 ECHO GUIDE OVA ASPIRATION: CPT

## 2022-06-16 PROCEDURE — 89261 SPERM ISOLATION COMPLEX: CPT

## 2022-06-16 PROCEDURE — 58970 RETRIEVAL OF OOCYTE: CPT

## 2022-06-20 PROCEDURE — 89253 EMBRYO HATCHING: CPT

## 2022-06-21 PROCEDURE — 89272 EXTENDED CULTURE OF OOCYTES: CPT

## 2022-06-21 PROCEDURE — 89258 CRYOPRESERVATION EMBRYO(S): CPT

## 2022-06-21 PROCEDURE — 89342 STORAGE/YEAR EMBRYO(S): CPT | Mod: NC

## 2022-06-21 PROCEDURE — 99072 ADDL SUPL MATRL&STAF TM PHE: CPT

## 2022-06-23 PROCEDURE — 89290 BIOPSY OOCYTE POLAR BODY <=5: CPT

## 2022-06-23 PROCEDURE — 89258 CRYOPRESERVATION EMBRYO(S): CPT

## 2022-06-27 ENCOUNTER — APPOINTMENT (OUTPATIENT)
Dept: HUMAN REPRODUCTION | Facility: CLINIC | Age: 22
End: 2022-06-27

## 2022-07-01 ENCOUNTER — APPOINTMENT (OUTPATIENT)
Dept: HUMAN REPRODUCTION | Facility: CLINIC | Age: 22
End: 2022-07-01

## 2022-07-01 PROCEDURE — 36415 COLL VENOUS BLD VENIPUNCTURE: CPT

## 2022-07-01 PROCEDURE — 82670 ASSAY OF TOTAL ESTRADIOL: CPT

## 2022-07-01 PROCEDURE — 76830 TRANSVAGINAL US NON-OB: CPT

## 2022-07-01 PROCEDURE — 99213 OFFICE O/P EST LOW 20 MIN: CPT | Mod: 25

## 2022-07-01 PROCEDURE — 99072 ADDL SUPL MATRL&STAF TM PHE: CPT

## 2022-07-01 PROCEDURE — 83002 ASSAY OF GONADOTROPIN (LH): CPT | Mod: QW

## 2022-07-06 ENCOUNTER — APPOINTMENT (OUTPATIENT)
Dept: HUMAN REPRODUCTION | Facility: CLINIC | Age: 22
End: 2022-07-06

## 2022-07-06 PROCEDURE — 76830 TRANSVAGINAL US NON-OB: CPT

## 2022-07-06 PROCEDURE — 99072 ADDL SUPL MATRL&STAF TM PHE: CPT

## 2022-07-06 PROCEDURE — 99213 OFFICE O/P EST LOW 20 MIN: CPT

## 2022-07-06 PROCEDURE — 36415 COLL VENOUS BLD VENIPUNCTURE: CPT

## 2022-07-12 ENCOUNTER — APPOINTMENT (OUTPATIENT)
Dept: HUMAN REPRODUCTION | Facility: CLINIC | Age: 22
End: 2022-07-12

## 2022-07-12 PROCEDURE — 36415 COLL VENOUS BLD VENIPUNCTURE: CPT

## 2022-07-12 PROCEDURE — 99213 OFFICE O/P EST LOW 20 MIN: CPT | Mod: 25

## 2022-07-12 PROCEDURE — 99072 ADDL SUPL MATRL&STAF TM PHE: CPT

## 2022-07-12 PROCEDURE — 76830 TRANSVAGINAL US NON-OB: CPT

## 2022-07-18 ENCOUNTER — APPOINTMENT (OUTPATIENT)
Dept: HUMAN REPRODUCTION | Facility: CLINIC | Age: 22
End: 2022-07-18

## 2022-07-18 PROCEDURE — 36415 COLL VENOUS BLD VENIPUNCTURE: CPT

## 2022-07-18 PROCEDURE — 99072 ADDL SUPL MATRL&STAF TM PHE: CPT

## 2022-07-18 RX ORDER — ASCORBIC ACID, CHOLECALCIFEROL, .ALPHA.-TOCOPHEROL ACETATE, DL-, PYRIDOXINE, FOLIC ACID, CYANOCOBALAMIN, CALCIUM, FERROUS FUMARATE, MAGNESIUM, DOCONEXENT 85; 200; 10; 25; 1; 12; 140; 27; 45; 300 [IU]/1; [IU]/1; [IU]/1; [IU]/1; MG/1; UG/1; MG/1; MG/1; MG/1; MG/1
27-0.6-0.4-3 CAPSULE, GELATIN COATED ORAL
Qty: 90 | Refills: 3 | Status: ACTIVE | COMMUNITY
Start: 2022-07-18 | End: 1900-01-01

## 2022-07-19 ENCOUNTER — APPOINTMENT (OUTPATIENT)
Dept: HUMAN REPRODUCTION | Facility: CLINIC | Age: 22
End: 2022-07-19

## 2022-07-19 PROCEDURE — 76705 ECHO EXAM OF ABDOMEN: CPT

## 2022-07-19 PROCEDURE — 99072 ADDL SUPL MATRL&STAF TM PHE: CPT

## 2022-07-19 PROCEDURE — 58974 EMBRYO TRANSFER INTRAUTERINE: CPT

## 2022-07-19 PROCEDURE — 89255 PREPARE EMBRYO FOR TRANSFER: CPT

## 2022-07-19 PROCEDURE — 89352 THAWING CRYOPRESRVED EMBRYO: CPT

## 2022-07-28 ENCOUNTER — APPOINTMENT (OUTPATIENT)
Dept: HUMAN REPRODUCTION | Facility: CLINIC | Age: 22
End: 2022-07-28

## 2022-07-28 PROCEDURE — 36415 COLL VENOUS BLD VENIPUNCTURE: CPT

## 2022-08-01 ENCOUNTER — APPOINTMENT (OUTPATIENT)
Dept: HUMAN REPRODUCTION | Facility: CLINIC | Age: 22
End: 2022-08-01

## 2022-08-01 PROCEDURE — 99213Y: CUSTOM | Mod: 25

## 2022-08-01 PROCEDURE — 99213 OFFICE O/P EST LOW 20 MIN: CPT | Mod: 25

## 2022-08-01 PROCEDURE — 76817 TRANSVAGINAL US OBSTETRIC: CPT

## 2022-08-01 PROCEDURE — 76830 TRANSVAGINAL US NON-OB: CPT

## 2022-08-01 PROCEDURE — 36415 COLL VENOUS BLD VENIPUNCTURE: CPT

## 2022-08-04 ENCOUNTER — APPOINTMENT (OUTPATIENT)
Dept: OBGYN | Facility: CLINIC | Age: 22
End: 2022-08-04

## 2022-08-04 VITALS — WEIGHT: 144 LBS | DIASTOLIC BLOOD PRESSURE: 73 MMHG | SYSTOLIC BLOOD PRESSURE: 113 MMHG

## 2022-08-04 DIAGNOSIS — Z31.83 ENCOUNTER FOR ASSISTED REPRODUCTIVE FERTILITY PROCEDURE CYCLE: ICD-10-CM

## 2022-08-04 DIAGNOSIS — M41.9 SCOLIOSIS, UNSPECIFIED: ICD-10-CM

## 2022-08-04 PROCEDURE — 0501F PRENATAL FLOW SHEET: CPT

## 2022-08-04 NOTE — PLAN
[FreeTextEntry1] : -very small sac with no +FHR today on unofficial sono \par -f/u PAP and GC/CT done today\par -f/u HCG drawn today and will continue to monitor\par -f/u prenatal blood work drawn today \par -RTO ten days for viability sono

## 2022-08-04 NOTE — HISTORY OF PRESENT ILLNESS
[Currently Active] : currently active [Men] : men [No] : No [FreeTextEntry1] : Patient is a 22 year old P1 LMP unknown who presents after she had a positive home pregnancy test. She is endorsing occasional nausea and breast tenderness. This is an IVF pregnancy and IVF transfer was done on 6/19/22. She is currently taking progesterone. \par

## 2022-08-08 ENCOUNTER — APPOINTMENT (OUTPATIENT)
Dept: OBGYN | Facility: CLINIC | Age: 22
End: 2022-08-08

## 2022-08-08 DIAGNOSIS — Z00.00 ENCOUNTER FOR GENERAL ADULT MEDICAL EXAMINATION W/OUT ABNORMAL FINDINGS: ICD-10-CM

## 2022-08-08 PROCEDURE — 36415 COLL VENOUS BLD VENIPUNCTURE: CPT

## 2022-08-09 LAB
ABO + RH PNL BLD: NORMAL
APPEARANCE: ABNORMAL
BACTERIA UR CULT: NORMAL
BACTERIA: NEGATIVE
BASOPHILS # BLD AUTO: 0.05 K/UL
BASOPHILS NFR BLD AUTO: 0.6 %
BILIRUBIN URINE: NEGATIVE
BLD GP AB SCN SERPL QL: NORMAL
BLOOD URINE: NEGATIVE
C TRACH RRNA SPEC QL NAA+PROBE: NOT DETECTED
COLOR: YELLOW
CYTOLOGY CVX/VAG DOC THIN PREP: NORMAL
EOSINOPHIL # BLD AUTO: 0.17 K/UL
EOSINOPHIL NFR BLD AUTO: 1.9 %
ESTIMATED AVERAGE GLUCOSE: 100 MG/DL
GLUCOSE QUALITATIVE U: NEGATIVE
GLUCOSE SERPL-MCNC: 89 MG/DL
HAV IGM SER QL: NONREACTIVE
HBA1C MFR BLD HPLC: 5.1 %
HBV CORE IGM SER QL: NONREACTIVE
HBV SURFACE AG SER QL: NONREACTIVE
HBV SURFACE AG SER QL: NONREACTIVE
HCG SERPL-MCNC: 1192 MIU/ML
HCG SERPL-MCNC: 2376 MIU/ML
HCT VFR BLD CALC: 42.7 %
HCV AB SER QL: NONREACTIVE
HCV S/CO RATIO: 0.14 S/CO
HGB A MFR BLD: 97.3 %
HGB A2 MFR BLD: 2.1 %
HGB BLD-MCNC: 13.1 G/DL
HGB FRACT BLD-IMP: NORMAL
HGB OTHER MFR BLD ELPH: 0.6 %
HIV1+2 AB SPEC QL IA.RAPID: NONREACTIVE
HPV 16 E6+E7 MRNA CVX QL NAA+PROBE: NOT DETECTED
HPV HIGH+LOW RISK DNA PNL CVX: NOT DETECTED
HPV18+45 E6+E7 MRNA CVX QL NAA+PROBE: NOT DETECTED
HYALINE CASTS: 7 /LPF
IMM GRANULOCYTES NFR BLD AUTO: 0.2 %
KETONES URINE: NEGATIVE
LEAD BLD-MCNC: <1 UG/DL
LEUKOCYTE ESTERASE URINE: NEGATIVE
LYMPHOCYTES # BLD AUTO: 2.7 K/UL
LYMPHOCYTES NFR BLD AUTO: 29.7 %
M TB IFN-G BLD-IMP: NEGATIVE
MAN DIFF?: NORMAL
MCHC RBC-ENTMCNC: 27.8 PG
MCHC RBC-ENTMCNC: 30.7 GM/DL
MCV RBC AUTO: 90.5 FL
MEV IGG FLD QL IA: >300 AU/ML
MEV IGG+IGM SER-IMP: POSITIVE
MICROSCOPIC-UA: NORMAL
MONOCYTES # BLD AUTO: 0.48 K/UL
MONOCYTES NFR BLD AUTO: 5.3 %
MUV AB SER-ACNC: POSITIVE
MUV IGG SER QL IA: 154 AU/ML
N GONORRHOEA RRNA SPEC QL NAA+PROBE: NOT DETECTED
NEUTROPHILS # BLD AUTO: 5.66 K/UL
NEUTROPHILS NFR BLD AUTO: 62.3 %
NITRITE URINE: NEGATIVE
PH URINE: 6.5
PLATELET # BLD AUTO: 324 K/UL
PROTEIN URINE: NORMAL
QUANTIFERON TB PLUS MITOGEN MINUS NIL: >10 IU/ML
QUANTIFERON TB PLUS NIL: 0.09 IU/ML
QUANTIFERON TB PLUS TB1 MINUS NIL: -0.03 IU/ML
QUANTIFERON TB PLUS TB2 MINUS NIL: -0.04 IU/ML
RBC # BLD: 4.72 M/UL
RBC # FLD: 13.1 %
RED BLOOD CELLS URINE: 8 /HPF
RUBV IGG FLD-ACNC: 4.8 INDEX
RUBV IGG SER-IMP: POSITIVE
SOURCE AMPLIFICATION: NORMAL
SPECIFIC GRAVITY URINE: 1.03
SQUAMOUS EPITHELIAL CELLS: 10 /HPF
T PALLIDUM AB SER QL IA: NEGATIVE
UROBILINOGEN URINE: NORMAL
WBC # FLD AUTO: 9.08 K/UL
WHITE BLOOD CELLS URINE: 0 /HPF

## 2022-08-14 ENCOUNTER — NON-APPOINTMENT (OUTPATIENT)
Age: 22
End: 2022-08-14

## 2022-08-15 ENCOUNTER — APPOINTMENT (OUTPATIENT)
Dept: ANTEPARTUM | Facility: CLINIC | Age: 22
End: 2022-08-15

## 2022-08-15 ENCOUNTER — APPOINTMENT (OUTPATIENT)
Dept: OBGYN | Facility: CLINIC | Age: 22
End: 2022-08-15

## 2022-08-15 VITALS — WEIGHT: 142 LBS | DIASTOLIC BLOOD PRESSURE: 77 MMHG | SYSTOLIC BLOOD PRESSURE: 111 MMHG

## 2022-08-15 LAB
AR GENE MUT ANL BLD/T: NORMAL
CFTR MUT TESTED BLD/T: NEGATIVE
FMR1 GENE MUT ANL BLD/T: NORMAL

## 2022-08-15 PROCEDURE — 76817 TRANSVAGINAL US OBSTETRIC: CPT

## 2022-08-15 PROCEDURE — 76801 OB US < 14 WKS SINGLE FETUS: CPT

## 2022-08-15 PROCEDURE — 36415 COLL VENOUS BLD VENIPUNCTURE: CPT

## 2022-08-15 PROCEDURE — 0502F SUBSEQUENT PRENATAL CARE: CPT

## 2022-08-16 LAB — HCG SERPL-MCNC: 7712 MIU/ML

## 2022-08-22 ENCOUNTER — APPOINTMENT (OUTPATIENT)
Dept: HUMAN REPRODUCTION | Facility: CLINIC | Age: 22
End: 2022-08-22

## 2022-08-22 ENCOUNTER — APPOINTMENT (OUTPATIENT)
Age: 22
End: 2022-08-22

## 2022-08-22 PROCEDURE — 99213 OFFICE O/P EST LOW 20 MIN: CPT | Mod: 25

## 2022-08-22 PROCEDURE — 76817 TRANSVAGINAL US OBSTETRIC: CPT

## 2022-08-27 ENCOUNTER — EMERGENCY (EMERGENCY)
Facility: HOSPITAL | Age: 22
LOS: 1 days | Discharge: HOME | End: 2022-08-27
Admitting: EMERGENCY MEDICINE

## 2022-08-27 VITALS
DIASTOLIC BLOOD PRESSURE: 70 MMHG | OXYGEN SATURATION: 99 % | HEART RATE: 81 BPM | RESPIRATION RATE: 18 BRPM | SYSTOLIC BLOOD PRESSURE: 110 MMHG | TEMPERATURE: 98 F | HEIGHT: 69 IN

## 2022-08-27 PROCEDURE — 99284 EMERGENCY DEPT VISIT MOD MDM: CPT

## 2022-08-27 NOTE — ED PROVIDER NOTE - GENITOURINARY VAGINAL DISCHARGE
+suppository noted on exam - pt admits to progesterone suppository that she takes for this pregnancy/none

## 2022-08-27 NOTE — ED ADULT NURSE NOTE - NSSEPSISSUSPECTED_ED_A_ED
Shanelle Chamberlain [facility]   1946     Risk: Moderate     Pre Post % Change Goal   Date: 9/11/2018      Physical       Sub Max ETT (mets)    10% increase   6MWT (feet) 1000ft     10% increase   UCSD Dyspnea Score 0   5 pt decrease   Supplemental O2 use (L) 0      [place] (est peak O2)       Peak exercise CR/SC (mets)    40% increase   Emotional       PHQ9 (> 10 refer to MD) 0   4 pt decrease   [place] (lower score = improvement)       Total 0   < 27   Feelings 1   < 3   Physical Fitness 4   < 3   Social Support 5   < 3   Daily Activities 2   < 3   Social Activities 1   < 3   Pain 1   < 3   Overall Health 2   < 3   Quality of Life 1   < 3   Change in Health 3   < 3   Dietary       Rate your plate 36   > 58   Measurements       Weight 200   2 5 - 5%   BMI N/A   19 - 25 No

## 2022-08-27 NOTE — ED ADULT NURSE NOTE - OBJECTIVE STATEMENT
Received the patient in intake 5 with c/o vaginal bleeding and cramping. Patient states she is 8 weeks pregnant. Alert and oriented x 4, not in acute distress. Safety  maintained. Vaginal examination carried out by BOBBY Thomas.  Patient's mother reported that they don't want to get her blood tested and they only want ultrasound. BOBBY Thomas made aware.

## 2022-08-27 NOTE — ED PROVIDER NOTE - OBJECTIVE STATEMENT
Pt is 22 y/o  w/ PMH scoliosis, 8 week gestation, confirmed IUP by U/S, p/w vaginal bleeding that restarted this morning. Pt states, she had vaginal bleeding episodes on Monday, was seen by Ob-gyn, resolved w/i last few days, but restarted this morning. States Pt was sitting on toilet having bowel movement, when she noticed something "plop" into toilet; Failed to visualize whether it was blood clot, fetal tissue, etc. Vaginal bleeding is associated w/ crampy abdominal pain localizing to suprapubic region, associate with nausea. Denies dysuria. Of note, current pregnancy was via IVP; last pregnancy was w/o complication & delivered vaginally.    Denies surgical Hx, but admits to regular progesterone depot injection, folic acid, and vitamins. Denies smoking. Pt is 22 y/o  w/ PMH scoliosis, 8 week gestation, confirmed IUP by U/S, p/w vaginal bleeding that restarted this morning. Pt states, she had vaginal bleeding episodes on Monday, was seen by Ob-gyn, resolved w/i last few days, but restarted this morning. States Pt was sitting on toilet having bowel movement, when she noticed something "plop" into toilet; Failed to visualize whether it was blood clot, fetal tissue, or BM. Vaginal bleeding is associated w/ crampy abdominal pain localizing to suprapubic region, associate with nausea. Denies dysuria. Of note, current pregnancy was via IVF; last pregnancy was w/o complication & delivered vaginally.    Denies surgical Hx, but admits to regular progesterone depot injection, folic acid, and vitamins. Denies smoking.

## 2022-08-27 NOTE — ED PROVIDER NOTE - CLINICAL SUMMARY MEDICAL DECISION MAKING FREE TEXT BOX
20 y/o  8 week gestation, confirmed IUP p/w vaginal bleeding since morning.    Concern for threatened  vs. normal pregnancy.   Will order labs including CBC, CMP, b-HCG (to trend).   Will provide Zofran for nausea; Tylenol for abdominal cramps  Will consult Ob-Gyn, reassess; if bleeding stop, d/c pt w/ Ob-gyn follow-up outpatient. 22 y/o  8 week gestation, confirmed IUP p/w vaginal bleeding since morning.    Concern for threatened  vs. normal pregnancy.   Will order labs including CBC, CMP, b-HCG (to trend) however pt declined any lab work and specifically states she only wants to have tvus performed and will f/u with her OBGYN for any necessary lab work.

## 2022-08-27 NOTE — ED PROVIDER NOTE - PROGRESS NOTE DETAILS
OB given courtesy call regarding pt arriving in ED for vag bleeding in early pregnancy already confirmed IUP, will call for consult if emergently needed

## 2022-08-27 NOTE — ED PROVIDER NOTE - NSICDXPASTMEDICALHX_GEN_ALL_CORE_FT
PAST MEDICAL HISTORY:  Scoliosis, unspecified scoliosis type, unspecified spinal region       
Goal: Patient will ambulate independently 250 ft with SC in 2 weeks

## 2022-08-27 NOTE — ED ADULT TRIAGE NOTE - CHIEF COMPLAINT QUOTE
Pt st" I am 8 weeks pregnant and started having bleeding since this morning alittle heavier than spotting....+ cramping...+ ultrasound done at 5 weeks everything was ok. '"

## 2022-08-27 NOTE — ED PROVIDER NOTE - NS ED SCRIBE ACP NAME FT
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     TYPE OF RADIATION THERAPY ADMINISTERED: 6000 cGy in 30 fractions         Oncologic history : Mrs. 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 She had radiation therapy in our clinic to a total dose of 6000 cGy in 30 treatments at 200 cGy each fraction from 10/17/2016 to 2016.  She tolerated radiation therapy reasonably well.  The patient did have some significant sore throat and dysphagia toward the end of the therapy, which required a PEG tube for nutritional support.  She otherwise was reasonably stable at the end of the therapy.     Interval history:    Patient reports doing well today. She continues her daily mouth care - using new soft toothbrush she received from Dental. Continues to follow-up with them routinely. Continues to do fluoride trays routinely and doing gargles.     Continued persistent pain to left face post shingles (postherpetic pain) - also improved - controlled with lidocaine patches, acupuncture, hot packs. (managed by pain and palliative med and PCP).    She continues to use therabite for Trismus. She feels may be slightly worse. She also reports continued difficulty with swallowing certain foods which she has been avoiding.     She is doing her head and neck exercises routinely. Reports muscle tension to left neck for several days - she felt a \"small knot\" in area resolved with warm compress and rest.     The patient denies any " hoarseness of voice, referred otalgia, dysphagia, facial paresthesias, bleeding, other pain, and recurrent/new lumps or bumps. Weight is stable.    ENT last seen 10/28/19: Improving pain right jaw overall and no evidence of ORN.  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 Continuing to follow. Next follow-up 4 months.     She was referred to Dr. Martinez for possible TM perforation and loss of balance.  She was referred for vestibular therapy and is been rescheduled that for January 2020. She reports also considering yoga to help with balance.     Reports has noted sores (papules) on the skin of her face (bilateral upper cheeks and nose). She was recommended to try and OTC skin cleanser by pharmacy and she states this was effective until 2 days ago when skin irritation returned. No scabbing, no bleeding. Patient has history of rosacea.    ROS otherwise negative on a 12-system review.       OBJECTIVE:   Vital Signs: /64   Pulse 65   Resp 18   Wt 72.9 kg (160 lb 12.8 oz)   SpO2 97%   BMI 27.82 kg/m  ;   GENERAL:  Appears well, in no acute distress.   HEENT: NCAT. No thrush, no mucositis.   Mucous membranes are dry. No masses or lymphadenopathy palpated. On exam noted to have small white patch to mucosa posteriolateral tongue on the right stable.   RESP: Breathing comfortably on RA  CV: WWP  NEURO: Normal gait.   SKIN: mild erythema of skin to nose - x2 papules noted to nose.     IMAGING:   CT OF THE NECK WITHOUT CONTRAST  9/19/2019 10:56 AM      HISTORY: Right posterior floor of mouth and ventral tongue cancer.  Status post resection with FF and XRT; evaluate for  recurrence/metastasis. Malignant neoplasm of base of tongue (H). Neck  pain on left side.     TECHNIQUE:  Axial CT images of the neck were acquired without  intravenous contrast. Coronal reconstructions were created.     COMPARISON: Soft tissue neck CT 6/5/2019.     FINDINGS: Postoperative changes consisting of a right neck  dissection  and right lateral floor of mouth resection with free flap  reconstruction again noted. Soft tissue contours in the surgical bed  are unchanged from the comparison study with no evidence for tumor  progression. However, evaluation is mildly limited by extensive  metallic artifact throughout the oral cavity.     There is no cervical lymphadenopathy. There are no fluid collections  or abscesses in the neck. The right submandibular gland has been  resected. The left submandibular gland and bilateral parotid glands  remain unremarkable. There is no evidence for mucosal space lesion.     A 0.8 cm hypodense nodule in the superior aspect of the right lobe of  the thyroid gland is again noted without change. Scattered smaller  nodules in the left lobe of the thyroid gland are also again noted  without change.     The lung apices remain clear.                                                                      IMPRESSION:  1. Postoperative changes to the right neck and right floor of mouth  with no evidence for tumor recurrence or progression. However,  evaluation of the oral cavity is limited by extensive metallic  artifact.  2. Thyroid nodules bilaterally, no change.  3. Otherwise, normal soft tissue neck CT.        Radiation dose for this scan was reduced using automated exposure  control, adjustment of the mA and/or kV according to patient size, or  iterative reconstruction technique.     ENRIQUETA CARMONA MD    CT CHEST W/O CONTRAST 9/19/2019 10:56 AM     HISTORY: Head and neck cancer. Assess for pulmonary metastases.     TECHNIQUE: CT of the chest is performed without IV contrast.     Assessed structures to the limits no IV contrast administration  include the lungs, mediastinum, pleura, and chest wall.     Radiation exposure is reduced using automated exposure control,  adjustment of the mA and/or kV according to patient size, or iterative  reconstruction technique.     COMPARISON: 11/5/2018.     FINDINGS:  There is a 2 mm noncalcified right lower lobe pulmonary  nodule on axial image 33. In retrospect this is stable from the prior  study, suggesting a benign entity. Previously seen left upper lobe  lung abnormality is resolved in the interim. No enlarged lymph nodes  are demonstrated. The thoracic aorta is upper normal in caliber.                                                                      IMPRESSION:  Negative chest CT for metastatic disease.     KRISTEN PEREZ MD    IMPRESSION:   Ms. 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  She is recovered from the acute toxicities of treatment.     CT chest 9/19/19 negative  for metastatic disease. CT soft tissue neck 9/19/19 showing postoperative changes to the right neck and right floor of mouth with no evidence for tumor recurrence or progression.    Seen by ENT 10/28/19  and exam completed. WILMA. Improving pain overall and no evidence of ORN.  Leukoplakia stable. Continuing to follow.  Next  Imaging due 9/2020 (per ENT).     RECOMMENDATIONS:    1. RTC in 6 months.    2. Continue to follow recommendations from ENT-  gargling with salt and baking soda and brushing less vigorously to prevent trauma.    3.  Continue to follow-up with ENT. Next appt 2/25/2020.    4. Continue to follow-up with Dental.     5. Continue to follow-up with  Pain and Palliative medicine for persistent pain to left face post shingles (postherpetic pain) - controlled with lidocaine patches, CBD oil, acupuncture, hot packs. CT negative.     6. Continue oral nutrition and continue swallowing and stretching exercises. Continue Therabite for Trismus.  Continue to follow-up with speech (last seen 6/2019).    7. TSH 1.51 on 2/2019. To reassess Q 6-12 months. To have lab today and will call pateint with results (see addendum)    8. Continue follow-up with PCP for general  health maintenance. Recommend patient follow-up with PCP for skin irritation that initially resolved with OTC cleansing solution.      9. Xerostomia:  patient to continue products for dry mouth including Biotene and Xylitol/Theramints gum. Reports cool mist vaporizer effective. Again prefers to defer Salagen at this time.      7. Cancer screening: Mammogram 12/2019 negative. Colonoscopy last 12/2017, next recommended 10 years.    8.  She was referred for vestibular therapy and is been rescheduled that for January 2020. Patient also plans to start yoga for balance.        Jen Rey [facility]  Radiation Therapy Center  [facility]  [address], [address]  [place] [address]    Addendum:   Ref Range & Units 11:40 AM 11mo ago 1yr ago   TSH 0.40 - 4.00 mU/L 1.86  1.51  1.12      Released results. Called patient (VM) - will repeat labs in 6-12 mths.    Martha Renteria

## 2022-08-27 NOTE — ED PROVIDER NOTE - PATIENT PORTAL LINK FT
You can access the FollowMyHealth Patient Portal offered by [facility] by registering at the following website: [email or web address]. By joining [facility]’s FollowMyHealth portal, you will also be able to view your health information using other applications (apps) compatible with our system.

## 2022-08-28 PROCEDURE — 76830 TRANSVAGINAL US NON-OB: CPT | Mod: 26

## 2022-08-30 ENCOUNTER — NON-APPOINTMENT (OUTPATIENT)
Age: 22
End: 2022-08-30

## 2022-08-31 ENCOUNTER — APPOINTMENT (OUTPATIENT)
Dept: OBGYN | Facility: CLINIC | Age: 22
End: 2022-08-31

## 2022-08-31 VITALS — SYSTOLIC BLOOD PRESSURE: 114 MMHG | DIASTOLIC BLOOD PRESSURE: 82 MMHG | WEIGHT: 142 LBS

## 2022-08-31 PROCEDURE — 0502F SUBSEQUENT PRENATAL CARE: CPT

## 2022-09-07 ENCOUNTER — EMERGENCY (EMERGENCY)
Facility: HOSPITAL | Age: 22
LOS: 1 days | Discharge: HOME | End: 2022-09-07
Attending: EMERGENCY MEDICINE | Admitting: EMERGENCY MEDICINE

## 2022-09-07 VITALS
SYSTOLIC BLOOD PRESSURE: 113 MMHG | OXYGEN SATURATION: 100 % | RESPIRATION RATE: 16 BRPM | TEMPERATURE: 99 F | DIASTOLIC BLOOD PRESSURE: 63 MMHG | HEIGHT: 69 IN | HEART RATE: 93 BPM

## 2022-09-07 PROCEDURE — 99285 EMERGENCY DEPT VISIT HI MDM: CPT

## 2022-09-07 NOTE — ED ADULT TRIAGE NOTE - CHIEF COMPLAINT QUOTE
Pt. brought to [facility] ED by [facility] ambulance for severe vaginal bleeding. Pt. is 10 week pregnant. MAYRA:  22, LMP: .  2 Para 1. PMH: denies. Taking prenatal vitamins. Subchronic hemmorhaging 2 weeks ago, evaluated, treated and released. Over last 3 days had vaginal spotting, and had 20 minute episode of vaginal clumping. c/o lightheadedness and nausea. OB/GYN Karley. NAD noted.

## 2022-09-08 ENCOUNTER — APPOINTMENT (OUTPATIENT)
Dept: OBGYN | Facility: CLINIC | Age: 22
End: 2022-09-08

## 2022-09-08 VITALS — WEIGHT: 141 LBS | SYSTOLIC BLOOD PRESSURE: 95 MMHG | DIASTOLIC BLOOD PRESSURE: 64 MMHG

## 2022-09-08 LAB
ALBUMIN SERPL ELPH-MCNC: 4.6 G/DL — SIGNIFICANT CHANGE UP (ref 3.3–5)
ALP SERPL-CCNC: 86 U/L — SIGNIFICANT CHANGE UP (ref 40–120)
ALT FLD-CCNC: 34 U/L — HIGH (ref 4–33)
ANION GAP SERPL CALC-SCNC: 13 MMOL/L — SIGNIFICANT CHANGE UP (ref 7–14)
APPEARANCE UR: CLEAR — SIGNIFICANT CHANGE UP
AST SERPL-CCNC: 20 U/L — SIGNIFICANT CHANGE UP (ref 4–32)
BASOPHILS # BLD AUTO: 0.04 K/UL — SIGNIFICANT CHANGE UP (ref 0–0.2)
BASOPHILS NFR BLD AUTO: 0.3 % — SIGNIFICANT CHANGE UP (ref 0–2)
BILIRUB SERPL-MCNC: 0.4 MG/DL — SIGNIFICANT CHANGE UP (ref 0.2–1.2)
BILIRUB UR-MCNC: NEGATIVE — SIGNIFICANT CHANGE UP
BUN SERPL-MCNC: 10 MG/DL — SIGNIFICANT CHANGE UP (ref 7–23)
CALCIUM SERPL-MCNC: 10.1 MG/DL — SIGNIFICANT CHANGE UP (ref 8.4–10.5)
CHLORIDE SERPL-SCNC: 98 MMOL/L — SIGNIFICANT CHANGE UP (ref 98–107)
CO2 SERPL-SCNC: 24 MMOL/L — SIGNIFICANT CHANGE UP (ref 22–31)
COLOR SPEC: SIGNIFICANT CHANGE UP
CREAT SERPL-MCNC: 0.47 MG/DL — LOW (ref 0.5–1.3)
DIFF PNL FLD: ABNORMAL
EGFR: 137 ML/MIN/1.73M2 — SIGNIFICANT CHANGE UP
EOSINOPHIL # BLD AUTO: 0.12 K/UL — SIGNIFICANT CHANGE UP (ref 0–0.5)
EOSINOPHIL NFR BLD AUTO: 0.9 % — SIGNIFICANT CHANGE UP (ref 0–6)
GLUCOSE SERPL-MCNC: 94 MG/DL — SIGNIFICANT CHANGE UP (ref 70–99)
GLUCOSE UR QL: NEGATIVE — SIGNIFICANT CHANGE UP
HCG SERPL-ACNC: SIGNIFICANT CHANGE UP MIU/ML
HCT VFR BLD CALC: 40.5 % — SIGNIFICANT CHANGE UP (ref 34.5–45)
HGB BLD-MCNC: 13.7 G/DL — SIGNIFICANT CHANGE UP (ref 11.5–15.5)
IANC: 10.83 K/UL — HIGH (ref 1.8–7.4)
IMM GRANULOCYTES NFR BLD AUTO: 0.4 % — SIGNIFICANT CHANGE UP (ref 0–1.5)
KETONES UR-MCNC: NEGATIVE — SIGNIFICANT CHANGE UP
LEUKOCYTE ESTERASE UR-ACNC: NEGATIVE — SIGNIFICANT CHANGE UP
LYMPHOCYTES # BLD AUTO: 15.4 % — SIGNIFICANT CHANGE UP (ref 13–44)
LYMPHOCYTES # BLD AUTO: 2.11 K/UL — SIGNIFICANT CHANGE UP (ref 1–3.3)
MCHC RBC-ENTMCNC: 27.7 PG — SIGNIFICANT CHANGE UP (ref 27–34)
MCHC RBC-ENTMCNC: 33.8 GM/DL — SIGNIFICANT CHANGE UP (ref 32–36)
MCV RBC AUTO: 82 FL — SIGNIFICANT CHANGE UP (ref 80–100)
MONOCYTES # BLD AUTO: 0.57 K/UL — SIGNIFICANT CHANGE UP (ref 0–0.9)
MONOCYTES NFR BLD AUTO: 4.2 % — SIGNIFICANT CHANGE UP (ref 2–14)
NEUTROPHILS # BLD AUTO: 10.83 K/UL — HIGH (ref 1.8–7.4)
NEUTROPHILS NFR BLD AUTO: 78.8 % — HIGH (ref 43–77)
NITRITE UR-MCNC: NEGATIVE — SIGNIFICANT CHANGE UP
NRBC # BLD: 0 /100 WBCS — SIGNIFICANT CHANGE UP (ref 0–0)
NRBC # FLD: 0 K/UL — SIGNIFICANT CHANGE UP (ref 0–0)
PH UR: 6.5 — SIGNIFICANT CHANGE UP (ref 5–8)
PLATELET # BLD AUTO: 326 K/UL — SIGNIFICANT CHANGE UP (ref 150–400)
POTASSIUM SERPL-MCNC: 3.6 MMOL/L — SIGNIFICANT CHANGE UP (ref 3.5–5.3)
POTASSIUM SERPL-SCNC: 3.6 MMOL/L — SIGNIFICANT CHANGE UP (ref 3.5–5.3)
PROT SERPL-MCNC: 8.5 G/DL — HIGH (ref 6–8.3)
PROT UR-MCNC: NEGATIVE — SIGNIFICANT CHANGE UP
RBC # BLD: 4.94 M/UL — SIGNIFICANT CHANGE UP (ref 3.8–5.2)
RBC # FLD: 11.9 % — SIGNIFICANT CHANGE UP (ref 10.3–14.5)
SODIUM SERPL-SCNC: 135 MMOL/L — SIGNIFICANT CHANGE UP (ref 135–145)
SP GR SPEC: 1.01 — SIGNIFICANT CHANGE UP (ref 1.01–1.05)
UROBILINOGEN FLD QL: SIGNIFICANT CHANGE UP
WBC # BLD: 13.73 K/UL — HIGH (ref 3.8–10.5)
WBC # FLD AUTO: 13.73 K/UL — HIGH (ref 3.8–10.5)

## 2022-09-08 PROCEDURE — 0502F SUBSEQUENT PRENATAL CARE: CPT

## 2022-09-08 PROCEDURE — 76817 TRANSVAGINAL US OBSTETRIC: CPT | Mod: 26

## 2022-09-08 NOTE — ED ADULT NURSE NOTE - CHIEF COMPLAINT QUOTE
Pt. brought to [facility] ED by [facility] ambulance for severe vaginal bleeding. Pt. is 10 week pregnant. MAYRA:  22, LMP: .  2 Para 1. PMH: denies. Taking prenatal vitamins. Subchronic hemmorhaging 2 weeks ago, evaluated, treated and released. Over last 3 days had vaginal spotting, and had 20 minute episode of vaginal clumping. c/o lightheadedness and nausea. OB/GYN Karley. NAD noted.

## 2022-09-08 NOTE — ED PROVIDER NOTE - PATIENT PORTAL LINK FT
You can access the FollowMyHealth Patient Portal offered by [facility] by registering at the following website: [email or web address]. By joining [employer]’s FollowMyHealth portal, you will also be able to view your health information using other applications (apps) compatible with our system.

## 2022-09-08 NOTE — ED ADULT NURSE NOTE - OBJECTIVE STATEMENT
break coverage RN - Pt received in room 1 A&OX3 accompanied by mother. Pt is approx 10 week pregnant.  pt recently seen for Subchronic hemorrhaging 2 weeks ago. Over last 3 days had vaginal spotting, and had 20 minute episode of heavy vaginal bleeding with clots. c/o lightheadedness, nausea, and generalized weakness. Resp even and unlabored. Pending further orders at this time. Will continue to monitor.

## 2022-09-08 NOTE — ED PROVIDER NOTE - NSFOLLOWUPINSTRUCTIONS_ED_ALL_ED_FT
You were seen in the Emergency Department for vaginal bleeding.    See your OBGYN later today.    If you have worsening bleeding, fever, chills, nausea, vomiting, new or worsening pain, or if you have any new symptoms return to the Emergency Department.

## 2022-09-08 NOTE — ED PROVIDER NOTE - PHYSICAL EXAMINATION
CONSTITUTIONAL: NAD  SKIN: Warm dry, normal skin turgor  HEAD: NCAT  EYES: EOMI, PERRLA, no scleral icterus, conjunctiva pink  ENT: normal pharynx with no erythema or exudates  NECK: Supple; non tender. Full ROM.  CARD: RRR, no murmurs.  RESP: clear to ausculation b/l. No crackles or wheezing.  ABD: soft, non-tender, non-distended, no rebound or guarding.  PSYCH: Cooperative, appropriate.   Gyn: CHaperone Myesha Thayer- clot removed from vaginal vault, os appears closed, no visible fetal tissue.

## 2022-09-08 NOTE — ED PROVIDER NOTE - CLINICAL SUMMARY MEDICAL DECISION MAKING FREE TEXT BOX
Concnern for threatened ab although w/ recent subchorionic hematoma likely related, will r/o  tvus, labs, hcg, type.

## 2022-09-08 NOTE — ED PROVIDER NOTE - ATTENDING CONTRIBUTION TO CARE
HPI: 22F  s/p ivf 10 wks pregnant by us presents to the ED for several days' vaginal bleeding, pt states she is feeling lightheaded, dizzy, vaginal bleeding today multiple large clots, pt denies any other vaginal discharge. Was recently discharged from ED after being told she has a subchorionic hemorrhage from prior tvus. Pt denies any recent illness, no n/v/d.  EXAM: NAd, abd soft nontender, pelvic (see resident note). Gait steady.   MDM: pt with no Past Medical History but here for vaginal bleeding. Follows with Dr Crandall has appt in AM. Will obtain labs and imaging and provide IVF and reassess. Concern is for known subchor hemorrhage vs miscarriage. Unlikely uterine rupture as no trauma, not hemorrhaging and VS WNL.

## 2022-09-08 NOTE — ED PROVIDER NOTE - OBJECTIVE STATEMENT
22F  s/p ivf 10 wks pregnant by us presents to the ED for several days' vaginal bleeding, pt states she is feeling lightheaded, dizzy, vaginal bleeding today multiple large clots, pt denies any other vaginal discharge. Was recently discharged from ED after being told she has a subchorionic hemorrhage from prior tvus. Pt denies any recent illness, no n/v/d.

## 2022-09-08 NOTE — ED ADULT NURSE NOTE - DRUG PRE-SCREENING (DAST -1)
Group Topic:  Education    Date: 4/3/2020  Start Time:  1:00 PM  End Time:  2:00 PM  Facilitators: Deann Fields RN; Loyda Nash RN    Focus: COVID-19  Number in attendance: 12    Pt [facility]. The video contained information on what COVID-19 is, how it is transmitted, and how to help stop the spread. Pt was given handouts on staying safe, hand washing, and social distancing.     Method: Group  Attendance: Present  Participation: minimal  Patient Response: Attentive and sleeping  Mood: lethargic  Affect: Calm  Behavior/Socialization: Indifferent  Thought Process: Focused  Task Performance: Follows directions  
Group Topic:  Substance Abuse Process Group    Date: 4/3/2020  Start Time:  9:00 AM  End Time: 10:00 AM  Facilitators: KALIE Foreman    Focus: Check in process  Number in attendance: 4  Pt engaged in check in process group, identifying moods, cravings, use or other concerns related to health, mental health and recovery.       Method: Group  Attendance: Present  Participation: Active  Patient Response: Attentive  Mood: Unremarkable  Affect: Calm  Behavior/Socialization: Appropriate to group and Cooperative  Thought Process: Focused  Task Performance: Follows directions  Patient reported their last use was on 3/18/2020 of alcohol. Patient reported sleeping 7 hours last night. Patient reports no difficulty falling asleep, a little struggle staying asleep, and denies using dreams. Patient rated appetite as fair and ate 3 meals within the last 24 hours. Patient rated depression at a 0/10 (10 being the worst) today. Patient denies any anxiety. Patient rated cravings at a 0/10 (10 being the worst) today. Patient attended 0 recovery meetings within the last week. Patient reports taking medications as prescribed. Patient denied any SI, HI, AVH during the group. The patient identified a recovery goal for the week. The goal is \"understand I need to continue to abstain from alcohol.\"     
Group Topic: BH DUDLEY Activity Group    Date: 4/3/2020  Start Time: 11:00 AM  End Time: 12:00 PM  Facilitators: Mary Rosa; Jacqueline Orellana LPC    Focus: Recognizing primary emotions in recovery  Number in attendance: 5    Patient's were given a 'miracle day' assignment; What is the first thing you notice when you wake up? Who would be with you? What would you do morning/afternoon/night? Where would you be, and/or where would you go? Patient's were asked to identify any patterns/themes-ie: nature, music. Patient's were asked to share for group discussion.      Method: Group  Attendance: Present  Participation: Active  Patient Response: Attentive  Mood: Indifferent  Affect: Calm  Behavior/Socialization: Appropriate to group and Cooperative  Thought Process: Focused  Task Performance: Follows directions    Patient was appropriate in presentation during time in session, however, patient left approximately 11:20 for reasons unknown and was unable to share the assignment.     
Group Topic: BH Recovery Preparedness    Date: 4/3/2020  Start Time:  1:45 PM  End Time:  2:30 PM  Facilitators: KALIE Foreman    Focus: Relapse prevention  Number in attendance: 5  Patient engaged in check out process identifying what was learned to help with accomplishing recovery goals tonight and motivation with which to do this. Graduation ceremony was enjoyed by all.     Method: Group  Attendance: Present  Participation: Active  Patient Response: Attentive  Mood: Unremarkable  Affect: Calm  Behavior/Socialization: Appropriate to group and Cooperative  Thought Process: Focused  Task Performance: Follows directions    
Group Topic: BH Recovery Skills    Date: 4/3/2020  Start Time: 10:15 AM  End Time: 11:00 AM  Facilitators: KALIE Foreman    Focus: Building self motivation  Number in attendance: 5  Video on \"Building Self Motivation\"\" inspires through many theorists concept on motivations and values to describe perspectives we general lack. Pt were able to identify some values in relation to their recovery and to boost some motivation for personal change.     Method: Group  Attendance: Present  Participation: Active  Patient Response: Attentive  Mood: Unremarkable  Affect: Calm  Behavior/Socialization: Appropriate to group and Cooperative  Thought Process: Focused  Task Performance: Follows directions    
Statement Selected

## 2022-09-08 NOTE — ED PROVIDER NOTE - PROGRESS NOTE DETAILS
Tanvir Conte, DO PGY-3: no change in size of subchorionic hematoma compared to prior, fhr, pt will see obgyn later today, ok to dishcarge, h/h stable, live iup

## 2022-09-09 DIAGNOSIS — O21.9 VOMITING OF PREGNANCY, UNSPECIFIED: ICD-10-CM

## 2022-09-09 LAB
CULTURE RESULTS: SIGNIFICANT CHANGE UP
SPECIMEN SOURCE: SIGNIFICANT CHANGE UP

## 2022-09-09 RX ORDER — DOXYLAMINE SUCCINATE AND PYRIDOXINE HYDROCHLORIDE 10; 10 MG/1; MG/1
10-10 TABLET, DELAYED RELEASE ORAL
Qty: 30 | Refills: 3 | Status: ACTIVE | COMMUNITY
Start: 2022-09-09 | End: 1900-01-01

## 2022-09-12 ENCOUNTER — APPOINTMENT (OUTPATIENT)
Dept: OBGYN | Facility: CLINIC | Age: 22
End: 2022-09-12

## 2022-09-12 ENCOUNTER — APPOINTMENT (OUTPATIENT)
Dept: ANTEPARTUM | Facility: CLINIC | Age: 22
End: 2022-09-12

## 2022-09-19 ENCOUNTER — NON-APPOINTMENT (OUTPATIENT)
Age: 22
End: 2022-09-19

## 2022-09-21 ENCOUNTER — APPOINTMENT (OUTPATIENT)
Dept: ANTEPARTUM | Facility: CLINIC | Age: 22
End: 2022-09-21

## 2022-09-21 ENCOUNTER — APPOINTMENT (OUTPATIENT)
Dept: OBGYN | Facility: CLINIC | Age: 22
End: 2022-09-21

## 2022-09-21 VITALS — SYSTOLIC BLOOD PRESSURE: 112 MMHG | WEIGHT: 142 LBS | DIASTOLIC BLOOD PRESSURE: 76 MMHG

## 2022-09-21 PROCEDURE — 76813 OB US NUCHAL MEAS 1 GEST: CPT

## 2022-09-21 PROCEDURE — 0502F SUBSEQUENT PRENATAL CARE: CPT

## 2022-09-21 RX ORDER — ONDANSETRON 4 MG/1
4 TABLET ORAL
Qty: 1 | Refills: 0 | Status: ACTIVE | COMMUNITY
Start: 2022-09-21 | End: 1900-01-01

## 2022-10-04 ENCOUNTER — NON-APPOINTMENT (OUTPATIENT)
Age: 22
End: 2022-10-04

## 2022-10-04 LAB — AFP PNL SERPL: NORMAL

## 2022-10-06 ENCOUNTER — APPOINTMENT (OUTPATIENT)
Dept: OBGYN | Facility: CLINIC | Age: 22
End: 2022-10-06

## 2022-10-06 ENCOUNTER — APPOINTMENT (OUTPATIENT)
Dept: ANTEPARTUM | Facility: CLINIC | Age: 22
End: 2022-10-06

## 2022-10-06 VITALS — WEIGHT: 142 LBS | SYSTOLIC BLOOD PRESSURE: 121 MMHG | DIASTOLIC BLOOD PRESSURE: 78 MMHG

## 2022-10-06 PROCEDURE — 0502F SUBSEQUENT PRENATAL CARE: CPT

## 2022-10-06 PROCEDURE — 76816 OB US FOLLOW-UP PER FETUS: CPT

## 2022-10-13 ENCOUNTER — NON-APPOINTMENT (OUTPATIENT)
Age: 22
End: 2022-10-13

## 2022-10-13 LAB
CLARI ADDITIONAL INFO: NORMAL
CLARI CHROMOSOME 13: NORMAL
CLARI CHROMOSOME 18: NORMAL
CLARI CHROMOSOME 21: NORMAL
CLARI SEX CHROMOSOMES: NORMAL
CLARI TEST COMMENT: NORMAL
CLARITEST NIPT: NORMAL
FETAL FRACT: NORMAL
GESTATION AGE: NORMAL
MATERNAL WEIGHT (LBS):: NORMAL
PLEASE INCLUDE GENDER RESULTS ON THIS REPORT:: NORMAL
TYPE OF PREGNANCY:: NORMAL

## 2022-10-26 ENCOUNTER — APPOINTMENT (OUTPATIENT)
Dept: OBGYN | Facility: CLINIC | Age: 22
End: 2022-10-26

## 2022-10-26 VITALS — DIASTOLIC BLOOD PRESSURE: 74 MMHG | WEIGHT: 147 LBS | SYSTOLIC BLOOD PRESSURE: 105 MMHG

## 2022-10-26 DIAGNOSIS — Z3A.16 16 WEEKS GESTATION OF PREGNANCY: ICD-10-CM

## 2022-10-26 PROCEDURE — 0502F SUBSEQUENT PRENATAL CARE: CPT

## 2022-10-31 LAB
ADDITIONAL US: NORMAL
AFP MOM: 2.18
AFP VALUE: 80.2 NG/ML
COLLECTED ON 2: NORMAL
COLLECTED ON: NORMAL
CRL SCAN TWIN B: NORMAL
CRL SCAN: NORMAL
CROWN RUMP LENGTH TWIN B: NORMAL
CROWN RUMP LENGTH: 51.8 MM
DIA MOM: 0.99
DIA VALUE: 156.4 PG/ML
DOWN SYNDROME AGE RISK: NORMAL
DOWN SYNDROME INTERPRETATION: NORMAL
DOWN SYNDROME SCREENING RISK: NORMAL
FIRST TRIMESTER SAMPLE: NORMAL
GEST. AGE ON COLLECTION DATE: 11.7 WEEKS
GESTATIONAL AGE: 16.7 WEEKS
HCG MOM: 1.49
HCG VALUE: 50.4 IU/ML
INSULIN DEP DIABETES: NORMAL
MATERNAL AGE AT EDD: 23.7 YR
NT MOM TWIN B: NORMAL
NT TWIN B: NORMAL
NUCHAL TRANSLUCENCY (NT): 1.1 MM
NUCHAL TRANSLUCENCY MOM: 0.86
NUMBER OF FETUSES: 1
OPEN SPINA BIFIDA: NORMAL
OSB INTERPRETATION: NORMAL
PAPP-A MOM: 0.27
PAPP-A VALUE: 199.8 NG/ML
RACE: NORMAL
SECOND TRIMESTER SAMPLE: NORMAL
SEQUENTIAL 2 COMMENTS: NORMAL
SEQUENTIAL 2 NOTE: NORMAL
SEQUENTIAL 2 RESULTS: NORMAL
SEQUENTIAL 2 TEST RESULTS: NORMAL
SONOGRAPHER ID#: NORMAL
TRISOMY 18 AGE RISK: NORMAL
TRISOMY 18 INTERPRETATION: NORMAL
TRISOMY 18 SCREENING RISK: NORMAL
UE3 MOM: 0.78
UE3 VALUE: 0.87 NG/ML
WEIGHT AFP: 142 LBS
WEIGHT: 147 LBS

## 2022-11-14 ENCOUNTER — NON-APPOINTMENT (OUTPATIENT)
Age: 22
End: 2022-11-14

## 2022-11-21 NOTE — ED PROVIDER NOTE - NSFOLLOWUPINSTRUCTIONS_ED_ALL_ED_FT
no WHAT YOU NEED TO KNOW:    Pelvic rest is when you do not put anything in your vagina. This includes tampons, douching, or sexual activity. You may need to avoid lifting heavy objects. Your healthcare provider will tell you how long you need pelvic rest.    DISCHARGE INSTRUCTIONS:    Return to the emergency department if:   •You have sudden, heavy bleeding from your vagina.      •You have severe pain.      Contact your healthcare provider if:   •You have discharge from your vagina that smells bad.       •You have a fever.      •You have questions or concerns about your condition or care.      Follow up with your doctor as directed: Write down your questions so you remember to ask them during your visits.      Rest, stay hydrated, take all meds as previously prescribed, Followup with your OBGYN within 2 days for post hospital visit. Show your doctor and specialists all copies of labs given to you. Return for worsening symptoms, ex. fever, abdominal pain, excessive vaginal bleeding (soaking 1 pad/hour) etc. Please read all the patient handouts.

## 2022-11-22 RX ORDER — ASCORBIC ACID, CHOLECALCIFEROL, .ALPHA.-TOCOPHEROL ACETATE, DL-, PYRIDOXINE, FOLIC ACID, CYANOCOBALAMIN, CALCIUM, FERROUS FUMARATE, MAGNESIUM, DOCONEXENT 85; 200; 10; 25; 1; 12; 140; 27; 45; 300 [IU]/1; [IU]/1; [IU]/1; [IU]/1; MG/1; UG/1; MG/1; MG/1; MG/1; MG/1
27-0.6-0.4-3 CAPSULE, GELATIN COATED ORAL
Qty: 90 | Refills: 3 | Status: ACTIVE | COMMUNITY
Start: 2022-11-22 | End: 1900-01-01

## 2022-11-30 ENCOUNTER — NON-APPOINTMENT (OUTPATIENT)
Age: 22
End: 2022-11-30

## 2022-11-30 ENCOUNTER — APPOINTMENT (OUTPATIENT)
Dept: ANTEPARTUM | Facility: CLINIC | Age: 22
End: 2022-11-30

## 2022-11-30 ENCOUNTER — APPOINTMENT (OUTPATIENT)
Dept: OBGYN | Facility: CLINIC | Age: 22
End: 2022-11-30

## 2022-11-30 VITALS — DIASTOLIC BLOOD PRESSURE: 57 MMHG | WEIGHT: 155 LBS | SYSTOLIC BLOOD PRESSURE: 96 MMHG

## 2022-11-30 PROCEDURE — 76811 OB US DETAILED SNGL FETUS: CPT

## 2022-11-30 PROCEDURE — 0502F SUBSEQUENT PRENATAL CARE: CPT

## 2022-12-02 DIAGNOSIS — R30.0 DYSURIA: ICD-10-CM

## 2022-12-05 LAB
APPEARANCE: ABNORMAL
BACTERIA UR CULT: NORMAL
BACTERIA: NEGATIVE
BILIRUBIN URINE: NEGATIVE
BLOOD URINE: ABNORMAL
COLOR: YELLOW
GLUCOSE QUALITATIVE U: NEGATIVE
HYALINE CASTS: 2 /LPF
KETONES URINE: NEGATIVE
LEUKOCYTE ESTERASE URINE: ABNORMAL
MICROSCOPIC-UA: NORMAL
NITRITE URINE: NEGATIVE
PH URINE: 6.5
PROTEIN URINE: NORMAL
RED BLOOD CELLS URINE: 4 /HPF
SPECIFIC GRAVITY URINE: 1.02
SQUAMOUS EPITHELIAL CELLS: 8 /HPF
UROBILINOGEN URINE: NORMAL
WHITE BLOOD CELLS URINE: 21 /HPF

## 2022-12-13 ENCOUNTER — APPOINTMENT (OUTPATIENT)
Dept: PEDIATRIC CARDIOLOGY | Facility: CLINIC | Age: 22
End: 2022-12-13

## 2022-12-13 PROCEDURE — 76820 UMBILICAL ARTERY ECHO: CPT

## 2022-12-13 PROCEDURE — 99202 OFFICE O/P NEW SF 15 MIN: CPT | Mod: 25

## 2022-12-13 PROCEDURE — 76825 ECHO EXAM OF FETAL HEART: CPT

## 2022-12-13 PROCEDURE — 93325 DOPPLER ECHO COLOR FLOW MAPG: CPT | Mod: 59

## 2022-12-13 PROCEDURE — 76827 ECHO EXAM OF FETAL HEART: CPT

## 2023-01-04 ENCOUNTER — APPOINTMENT (OUTPATIENT)
Dept: OBGYN | Facility: CLINIC | Age: 23
End: 2023-01-04
Payer: MEDICAID

## 2023-01-04 VITALS
BODY MASS INDEX: 24.14 KG/M2 | DIASTOLIC BLOOD PRESSURE: 71 MMHG | HEIGHT: 69 IN | WEIGHT: 163 LBS | SYSTOLIC BLOOD PRESSURE: 111 MMHG

## 2023-01-04 DIAGNOSIS — Z3A.26 26 WEEKS GESTATION OF PREGNANCY: ICD-10-CM

## 2023-01-04 PROCEDURE — 0502F SUBSEQUENT PRENATAL CARE: CPT

## 2023-01-04 PROCEDURE — 36415 COLL VENOUS BLD VENIPUNCTURE: CPT

## 2023-01-05 LAB
BASOPHILS # BLD AUTO: 0.04 K/UL
BASOPHILS NFR BLD AUTO: 0.4 %
EOSINOPHIL # BLD AUTO: 0.07 K/UL
EOSINOPHIL NFR BLD AUTO: 0.7 %
GLUCOSE 1H P 50 G GLC PO SERPL-MCNC: 101 MG/DL
HCT VFR BLD CALC: 41.3 %
HGB BLD-MCNC: 12.3 G/DL
IMM GRANULOCYTES NFR BLD AUTO: 0.9 %
LYMPHOCYTES # BLD AUTO: 1.7 K/UL
LYMPHOCYTES NFR BLD AUTO: 17.2 %
MAN DIFF?: NORMAL
MCHC RBC-ENTMCNC: 28.1 PG
MCHC RBC-ENTMCNC: 29.8 GM/DL
MCV RBC AUTO: 94.3 FL
MONOCYTES # BLD AUTO: 0.57 K/UL
MONOCYTES NFR BLD AUTO: 5.8 %
NEUTROPHILS # BLD AUTO: 7.43 K/UL
NEUTROPHILS NFR BLD AUTO: 75 %
PLATELET # BLD AUTO: 238 K/UL
RBC # BLD: 4.38 M/UL
RBC # FLD: 15.9 %
WBC # FLD AUTO: 9.9 K/UL

## 2023-01-18 ENCOUNTER — APPOINTMENT (OUTPATIENT)
Dept: OBGYN | Facility: CLINIC | Age: 23
End: 2023-01-18
Payer: MEDICAID

## 2023-01-18 VITALS — WEIGHT: 167 LBS | DIASTOLIC BLOOD PRESSURE: 65 MMHG | SYSTOLIC BLOOD PRESSURE: 99 MMHG

## 2023-01-18 PROCEDURE — 0502F SUBSEQUENT PRENATAL CARE: CPT

## 2023-02-01 ENCOUNTER — APPOINTMENT (OUTPATIENT)
Dept: ANTEPARTUM | Facility: CLINIC | Age: 23
End: 2023-02-01
Payer: MEDICAID

## 2023-02-01 ENCOUNTER — APPOINTMENT (OUTPATIENT)
Dept: OBGYN | Facility: CLINIC | Age: 23
End: 2023-02-01
Payer: MEDICAID

## 2023-02-01 VITALS — DIASTOLIC BLOOD PRESSURE: 64 MMHG | SYSTOLIC BLOOD PRESSURE: 108 MMHG | WEIGHT: 171 LBS

## 2023-02-01 PROCEDURE — 76816 OB US FOLLOW-UP PER FETUS: CPT

## 2023-02-01 PROCEDURE — 0502F SUBSEQUENT PRENATAL CARE: CPT

## 2023-02-01 PROCEDURE — 76819 FETAL BIOPHYS PROFIL W/O NST: CPT | Mod: 59

## 2023-02-22 ENCOUNTER — APPOINTMENT (OUTPATIENT)
Dept: OBGYN | Facility: CLINIC | Age: 23
End: 2023-02-22
Payer: MEDICAID

## 2023-02-22 VITALS
DIASTOLIC BLOOD PRESSURE: 73 MMHG | SYSTOLIC BLOOD PRESSURE: 114 MMHG | WEIGHT: 173 LBS | TEMPERATURE: 98.4 F | HEART RATE: 94 BPM | BODY MASS INDEX: 25.62 KG/M2 | HEIGHT: 69 IN

## 2023-02-22 PROCEDURE — 0502F SUBSEQUENT PRENATAL CARE: CPT

## 2023-03-13 ENCOUNTER — APPOINTMENT (OUTPATIENT)
Dept: OBGYN | Facility: CLINIC | Age: 23
End: 2023-03-13
Payer: MEDICAID

## 2023-03-13 VITALS — WEIGHT: 181 LBS | SYSTOLIC BLOOD PRESSURE: 119 MMHG | DIASTOLIC BLOOD PRESSURE: 64 MMHG

## 2023-03-13 DIAGNOSIS — Z33.1 PREGNANT STATE, INCIDENTAL: ICD-10-CM

## 2023-03-13 PROCEDURE — 0502F SUBSEQUENT PRENATAL CARE: CPT

## 2023-03-14 LAB — HIV1+2 AB SPEC QL IA.RAPID: NONREACTIVE

## 2023-03-16 LAB — B-HEM STREP SPEC QL CULT: NORMAL

## 2023-03-22 ENCOUNTER — NON-APPOINTMENT (OUTPATIENT)
Age: 23
End: 2023-03-22

## 2023-03-22 ENCOUNTER — APPOINTMENT (OUTPATIENT)
Dept: OBGYN | Facility: CLINIC | Age: 23
End: 2023-03-22
Payer: MEDICAID

## 2023-03-22 VITALS — SYSTOLIC BLOOD PRESSURE: 117 MMHG | WEIGHT: 184 LBS | DIASTOLIC BLOOD PRESSURE: 75 MMHG

## 2023-03-22 PROCEDURE — 0502F SUBSEQUENT PRENATAL CARE: CPT

## 2023-03-30 ENCOUNTER — APPOINTMENT (OUTPATIENT)
Dept: OBGYN | Facility: CLINIC | Age: 23
End: 2023-03-30
Payer: MEDICAID

## 2023-03-30 ENCOUNTER — APPOINTMENT (OUTPATIENT)
Dept: ANTEPARTUM | Facility: CLINIC | Age: 23
End: 2023-03-30
Payer: MEDICAID

## 2023-03-30 VITALS
WEIGHT: 184 LBS | SYSTOLIC BLOOD PRESSURE: 125 MMHG | DIASTOLIC BLOOD PRESSURE: 79 MMHG | BODY MASS INDEX: 27.25 KG/M2 | HEIGHT: 69 IN

## 2023-03-30 PROCEDURE — 76819 FETAL BIOPHYS PROFIL W/O NST: CPT | Mod: 59

## 2023-03-30 PROCEDURE — 0502F SUBSEQUENT PRENATAL CARE: CPT

## 2023-03-30 PROCEDURE — 76816 OB US FOLLOW-UP PER FETUS: CPT

## 2023-04-10 ENCOUNTER — APPOINTMENT (OUTPATIENT)
Dept: ANTEPARTUM | Facility: CLINIC | Age: 23
End: 2023-04-10
Payer: MEDICAID

## 2023-04-10 ENCOUNTER — NON-APPOINTMENT (OUTPATIENT)
Age: 23
End: 2023-04-10

## 2023-04-10 ENCOUNTER — APPOINTMENT (OUTPATIENT)
Dept: OBGYN | Facility: CLINIC | Age: 23
End: 2023-04-10
Payer: MEDICAID

## 2023-04-10 VITALS
DIASTOLIC BLOOD PRESSURE: 81 MMHG | WEIGHT: 183 LBS | SYSTOLIC BLOOD PRESSURE: 125 MMHG | BODY MASS INDEX: 27.11 KG/M2 | HEIGHT: 69 IN

## 2023-04-10 PROCEDURE — 76818 FETAL BIOPHYS PROFILE W/NST: CPT

## 2023-04-10 PROCEDURE — 0502F SUBSEQUENT PRENATAL CARE: CPT

## 2023-04-14 ENCOUNTER — APPOINTMENT (OUTPATIENT)
Dept: ANTEPARTUM | Facility: CLINIC | Age: 23
End: 2023-04-14
Payer: MEDICAID

## 2023-04-14 ENCOUNTER — APPOINTMENT (OUTPATIENT)
Dept: OBGYN | Facility: CLINIC | Age: 23
End: 2023-04-14
Payer: MEDICAID

## 2023-04-14 ENCOUNTER — NON-APPOINTMENT (OUTPATIENT)
Age: 23
End: 2023-04-14

## 2023-04-14 PROCEDURE — 76816 OB US FOLLOW-UP PER FETUS: CPT

## 2023-04-14 PROCEDURE — 76818 FETAL BIOPHYS PROFILE W/NST: CPT | Mod: 59

## 2023-04-14 PROCEDURE — 0502F SUBSEQUENT PRENATAL CARE: CPT

## 2023-04-14 PROCEDURE — 59426 ANTEPARTUM CARE ONLY: CPT

## 2023-04-15 LAB — SARS-COV-2 N GENE NPH QL NAA+PROBE: NOT DETECTED

## 2023-04-16 ENCOUNTER — INPATIENT (INPATIENT)
Facility: HOSPITAL | Age: 23
LOS: 1 days | Discharge: HOME | End: 2023-04-18
Attending: OBSTETRICS & GYNECOLOGY | Admitting: OBSTETRICS & GYNECOLOGY
Payer: MEDICAID

## 2023-04-16 ENCOUNTER — TRANSCRIPTION ENCOUNTER (OUTPATIENT)
Age: 23
End: 2023-04-16

## 2023-04-16 VITALS
RESPIRATION RATE: 18 BRPM | HEART RATE: 91 BPM | SYSTOLIC BLOOD PRESSURE: 117 MMHG | TEMPERATURE: 98 F | DIASTOLIC BLOOD PRESSURE: 73 MMHG

## 2023-04-16 DIAGNOSIS — O41.00X0 OLIGOHYDRAMNIOS, UNSPECIFIED TRIMESTER, NOT APPLICABLE OR UNSPECIFIED: ICD-10-CM

## 2023-04-16 LAB
BASOPHILS # BLD AUTO: 0.04 K/UL — SIGNIFICANT CHANGE UP (ref 0–0.2)
BASOPHILS NFR BLD AUTO: 0.4 % — SIGNIFICANT CHANGE UP (ref 0–2)
BLD GP AB SCN SERPL QL: NEGATIVE — SIGNIFICANT CHANGE UP
COVID-19 SPIKE DOMAIN AB INTERP: POSITIVE
COVID-19 SPIKE DOMAIN ANTIBODY RESULT: >250 U/ML — HIGH
EOSINOPHIL # BLD AUTO: 0.16 K/UL — SIGNIFICANT CHANGE UP (ref 0–0.5)
EOSINOPHIL NFR BLD AUTO: 1.4 % — SIGNIFICANT CHANGE UP (ref 0–6)
HCT VFR BLD CALC: 39.7 % — SIGNIFICANT CHANGE UP (ref 34.5–45)
HGB BLD-MCNC: 12.8 G/DL — SIGNIFICANT CHANGE UP (ref 11.5–15.5)
IANC: 7.59 K/UL — HIGH (ref 1.8–7.4)
IMM GRANULOCYTES NFR BLD AUTO: 0.7 % — SIGNIFICANT CHANGE UP (ref 0–0.9)
LYMPHOCYTES # BLD AUTO: 2.64 K/UL — SIGNIFICANT CHANGE UP (ref 1–3.3)
LYMPHOCYTES # BLD AUTO: 23.5 % — SIGNIFICANT CHANGE UP (ref 13–44)
MCHC RBC-ENTMCNC: 27.8 PG — SIGNIFICANT CHANGE UP (ref 27–34)
MCHC RBC-ENTMCNC: 32.2 GM/DL — SIGNIFICANT CHANGE UP (ref 32–36)
MCV RBC AUTO: 86.3 FL — SIGNIFICANT CHANGE UP (ref 80–100)
MONOCYTES # BLD AUTO: 0.74 K/UL — SIGNIFICANT CHANGE UP (ref 0–0.9)
MONOCYTES NFR BLD AUTO: 6.6 % — SIGNIFICANT CHANGE UP (ref 2–14)
NEUTROPHILS # BLD AUTO: 7.59 K/UL — HIGH (ref 1.8–7.4)
NEUTROPHILS NFR BLD AUTO: 67.4 % — SIGNIFICANT CHANGE UP (ref 43–77)
NRBC # BLD: 0 /100 WBCS — SIGNIFICANT CHANGE UP (ref 0–0)
NRBC # FLD: 0 K/UL — SIGNIFICANT CHANGE UP (ref 0–0)
PLATELET # BLD AUTO: 194 K/UL — SIGNIFICANT CHANGE UP (ref 150–400)
RBC # BLD: 4.6 M/UL — SIGNIFICANT CHANGE UP (ref 3.8–5.2)
RBC # FLD: 13.5 % — SIGNIFICANT CHANGE UP (ref 10.3–14.5)
RH IG SCN BLD-IMP: POSITIVE — SIGNIFICANT CHANGE UP
SARS-COV-2 IGG+IGM SERPL QL IA: >250 U/ML — HIGH
SARS-COV-2 IGG+IGM SERPL QL IA: POSITIVE
T PALLIDUM AB TITR SER: NEGATIVE — SIGNIFICANT CHANGE UP
WBC # BLD: 11.25 K/UL — HIGH (ref 3.8–10.5)
WBC # FLD AUTO: 11.25 K/UL — HIGH (ref 3.8–10.5)

## 2023-04-16 PROCEDURE — 59410 OBSTETRICAL CARE: CPT | Mod: U9

## 2023-04-16 RX ORDER — SODIUM CHLORIDE 9 MG/ML
3 INJECTION INTRAMUSCULAR; INTRAVENOUS; SUBCUTANEOUS EVERY 8 HOURS
Refills: 0 | Status: DISCONTINUED | OUTPATIENT
Start: 2023-04-16 | End: 2023-04-18

## 2023-04-16 RX ORDER — TETANUS TOXOID, REDUCED DIPHTHERIA TOXOID AND ACELLULAR PERTUSSIS VACCINE, ADSORBED 5; 2.5; 8; 8; 2.5 [IU]/.5ML; [IU]/.5ML; UG/.5ML; UG/.5ML; UG/.5ML
0.5 SUSPENSION INTRAMUSCULAR ONCE
Refills: 0 | Status: ACTIVE | OUTPATIENT
Start: 2023-04-16

## 2023-04-16 RX ORDER — LANOLIN
1 OINTMENT (GRAM) TOPICAL EVERY 6 HOURS
Refills: 0 | Status: DISCONTINUED | OUTPATIENT
Start: 2023-04-16 | End: 2023-04-18

## 2023-04-16 RX ORDER — PRAMOXINE HYDROCHLORIDE 150 MG/15G
1 AEROSOL, FOAM RECTAL EVERY 4 HOURS
Refills: 0 | Status: DISCONTINUED | OUTPATIENT
Start: 2023-04-16 | End: 2023-04-18

## 2023-04-16 RX ORDER — DIPHENHYDRAMINE HCL 50 MG
25 CAPSULE ORAL EVERY 6 HOURS
Refills: 0 | Status: DISCONTINUED | OUTPATIENT
Start: 2023-04-16 | End: 2023-04-18

## 2023-04-16 RX ORDER — SIMETHICONE 80 MG/1
80 TABLET, CHEWABLE ORAL EVERY 4 HOURS
Refills: 0 | Status: DISCONTINUED | OUTPATIENT
Start: 2023-04-16 | End: 2023-04-18

## 2023-04-16 RX ORDER — KETOROLAC TROMETHAMINE 30 MG/ML
30 SYRINGE (ML) INJECTION ONCE
Refills: 0 | Status: DISCONTINUED | OUTPATIENT
Start: 2023-04-16 | End: 2023-04-16

## 2023-04-16 RX ORDER — AER TRAVELER 0.5 G/1
1 SOLUTION RECTAL; TOPICAL EVERY 4 HOURS
Refills: 0 | Status: DISCONTINUED | OUTPATIENT
Start: 2023-04-16 | End: 2023-04-18

## 2023-04-16 RX ORDER — IBUPROFEN 200 MG
600 TABLET ORAL EVERY 6 HOURS
Refills: 0 | Status: COMPLETED | OUTPATIENT
Start: 2023-04-16 | End: 2024-03-14

## 2023-04-16 RX ORDER — BENZOCAINE 10 %
1 GEL (GRAM) MUCOUS MEMBRANE EVERY 6 HOURS
Refills: 0 | Status: DISCONTINUED | OUTPATIENT
Start: 2023-04-16 | End: 2023-04-18

## 2023-04-16 RX ORDER — SODIUM CHLORIDE 9 MG/ML
1000 INJECTION, SOLUTION INTRAVENOUS
Refills: 0 | Status: DISCONTINUED | OUTPATIENT
Start: 2023-04-16 | End: 2023-04-16

## 2023-04-16 RX ORDER — OXYTOCIN 10 UNIT/ML
41.67 VIAL (ML) INJECTION
Qty: 20 | Refills: 0 | Status: DISCONTINUED | OUTPATIENT
Start: 2023-04-16 | End: 2023-04-17

## 2023-04-16 RX ORDER — CHLORHEXIDINE GLUCONATE 213 G/1000ML
1 SOLUTION TOPICAL ONCE
Refills: 0 | Status: DISCONTINUED | OUTPATIENT
Start: 2023-04-16 | End: 2023-04-16

## 2023-04-16 RX ORDER — OXYTOCIN 10 UNIT/ML
VIAL (ML) INJECTION
Qty: 30 | Refills: 0 | Status: DISCONTINUED | OUTPATIENT
Start: 2023-04-16 | End: 2023-04-16

## 2023-04-16 RX ORDER — OXYTOCIN 10 UNIT/ML
333.33 VIAL (ML) INJECTION
Qty: 20 | Refills: 0 | Status: DISCONTINUED | OUTPATIENT
Start: 2023-04-16 | End: 2023-04-16

## 2023-04-16 RX ORDER — OXYCODONE HYDROCHLORIDE 5 MG/1
5 TABLET ORAL
Refills: 0 | Status: DISCONTINUED | OUTPATIENT
Start: 2023-04-16 | End: 2023-04-18

## 2023-04-16 RX ORDER — SODIUM CHLORIDE 9 MG/ML
500 INJECTION INTRAMUSCULAR; INTRAVENOUS; SUBCUTANEOUS ONCE
Refills: 0 | Status: COMPLETED | OUTPATIENT
Start: 2023-04-16 | End: 2023-04-16

## 2023-04-16 RX ORDER — MAGNESIUM HYDROXIDE 400 MG/1
30 TABLET, CHEWABLE ORAL
Refills: 0 | Status: DISCONTINUED | OUTPATIENT
Start: 2023-04-16 | End: 2023-04-18

## 2023-04-16 RX ORDER — DIBUCAINE 1 %
1 OINTMENT (GRAM) RECTAL EVERY 6 HOURS
Refills: 0 | Status: DISCONTINUED | OUTPATIENT
Start: 2023-04-16 | End: 2023-04-18

## 2023-04-16 RX ORDER — HYDROCORTISONE 1 %
1 OINTMENT (GRAM) TOPICAL EVERY 6 HOURS
Refills: 0 | Status: DISCONTINUED | OUTPATIENT
Start: 2023-04-16 | End: 2023-04-18

## 2023-04-16 RX ORDER — SODIUM CHLORIDE 9 MG/ML
1000 INJECTION, SOLUTION INTRAVENOUS ONCE
Refills: 0 | Status: DISCONTINUED | OUTPATIENT
Start: 2023-04-16 | End: 2023-04-16

## 2023-04-16 RX ORDER — OXYCODONE HYDROCHLORIDE 5 MG/1
5 TABLET ORAL ONCE
Refills: 0 | Status: DISCONTINUED | OUTPATIENT
Start: 2023-04-16 | End: 2023-04-18

## 2023-04-16 RX ORDER — ACETAMINOPHEN 500 MG
975 TABLET ORAL
Refills: 0 | Status: DISCONTINUED | OUTPATIENT
Start: 2023-04-16 | End: 2023-04-18

## 2023-04-16 RX ORDER — SODIUM CHLORIDE 9 MG/ML
1000 INJECTION, SOLUTION INTRAVENOUS ONCE
Refills: 0 | Status: COMPLETED | OUTPATIENT
Start: 2023-04-16 | End: 2023-04-16

## 2023-04-16 RX ADMIN — Medication 30 MILLIGRAM(S): at 21:44

## 2023-04-16 RX ADMIN — SODIUM CHLORIDE 125 MILLILITER(S): 9 INJECTION, SOLUTION INTRAVENOUS at 06:33

## 2023-04-16 RX ADMIN — Medication 125 MILLIUNIT(S)/MIN: at 22:33

## 2023-04-16 RX ADMIN — SODIUM CHLORIDE 2000 MILLILITER(S): 9 INJECTION, SOLUTION INTRAVENOUS at 21:47

## 2023-04-16 RX ADMIN — Medication 2 MILLIUNIT(S)/MIN: at 12:55

## 2023-04-16 RX ADMIN — SODIUM CHLORIDE 3 MILLILITER(S): 9 INJECTION INTRAMUSCULAR; INTRAVENOUS; SUBCUTANEOUS at 22:00

## 2023-04-16 RX ADMIN — Medication 30 MILLIGRAM(S): at 21:15

## 2023-04-16 RX ADMIN — SODIUM CHLORIDE 500 MILLILITER(S): 9 INJECTION INTRAMUSCULAR; INTRAVENOUS; SUBCUTANEOUS at 17:30

## 2023-04-16 NOTE — DISCHARGE NOTE OB - PATIENT PORTAL LINK FT
You can access the FollowMyHealth Patient Portal offered by [facility] by registering at the following website: [email or web address]. By joining [email or web address]’s FollowMyHealth portal, you will also be able to view your health information using other applications (apps) compatible with our system.

## 2023-04-16 NOTE — OB PROVIDER LABOR PROGRESS NOTE - NS_SUBJECTIVE/OBJECTIVE_OBGYN_ALL_OB_FT
Patient seen and examined at bedside  States she is feeling pressure  FHR with early decels    SVE: 9.5/ 100/+1  CNX: q2  EFM Cat 1    Plan  continue monitoring    Rosi Meza DO

## 2023-04-16 NOTE — OB RN PATIENT PROFILE - FUNCTIONAL ASSESSMENT - BASIC MOBILITY 1.
chest pain/L sided sharp chest pain ( ICD firing) 4 = No assist / stand by assistance chest pain/L sided chest pain pressure  and + ICD firing

## 2023-04-16 NOTE — OB PROVIDER LABOR PROGRESS NOTE - ASSESSMENT
pt is stable  fht cat 1 with rare variable- then cat 2  discussed with Dr. Meza- will start pitocin at 1230 depending on tracing  NDcallyon cnm
LT IOL.    -Labor: for PO Cytotec. CB placed without issue  -Fetus: cat 1, reassuring  -GBS: neg  -Pain: pt not requesting intervention for pain at this time    D/w Dr. Britney Sher PGY1

## 2023-04-16 NOTE — OB RN PATIENT PROFILE - NS_FINALEDD_OBGYN_ALL_OB_DT
Assistance with ambulation/Assistance OOB with selected safe patient handling equipment/Communicate Risk of Fall with Harm to all staff/Monitor for mental status changes/Monitor gait and stability/Reinforce activity limits and safety measures with patient and family/Tailored Fall Risk Interventions/Toileting schedule using arm’s reach rule for commode and bathroom/Use of alarms - bed, chair and/or voice tab/Visual Cue: Yellow wristband and red socks/Bed in lowest position, wheels locked, appropriate side rails in place/Call bell, personal items and telephone in reach/Instruct patient to call for assistance before getting out of bed or chair/Non-slip footwear when patient is out of bed/Wells to call system/Physically safe environment - no spills, clutter or unnecessary equipment/Purposeful Proactive Rounding/Room/bathroom lighting operational, light cord in reach 07-Apr-2023

## 2023-04-16 NOTE — OB PROVIDER DELIVERY SUMMARY - NSSELHIDDEN_OBGYN_ALL_OB_FT
[NS_DeliveryAttending1_OBGYN_ALL_OB_FT:[id number]],[NS_DeliveryAttending2_OBGYN_ALL_OB_FT:[id number]=]

## 2023-04-16 NOTE — DISCHARGE NOTE OB - CARE PLAN
1 Principal Discharge DX:	Vaginal delivery  Assessment and plan of treatment:	Nothing per vagina.   No tub soaking or heavy lifting.   Principal Discharge DX:	Vaginal delivery  Assessment and plan of treatment:	Nothing per vagina.   No tub soaking or heavy lifting.  Continue taking pain medications as needed  F/up in 6 weeks

## 2023-04-16 NOTE — OB PROVIDER LABOR PROGRESS NOTE - NS_SUBJECTIVE/OBJECTIVE_OBGYN_ALL_OB_FT
Patient seen and examined at bedside  States she is feeling pelvic pressure and felt a gush around 4:00pm    SVE: 5-6cm/80/0 ruptured  CNX: q1-2min  EFM: Cat1    Plan  continue pit  Patient to be placed in high fowlers with peanut ball by nursing\    Rosi Meza DO

## 2023-04-16 NOTE — DISCHARGE NOTE OB - CARE PROVIDER_API CALL
Naveen Crandall)  MaternalFetal Medicine; Obstetrics and Gynecology  [address] [place] [address]  Phone: [phone]  Fax: [phone]  Follow Up Time:    Naveen Crandall)  MaternalFetal Medicine; Obstetrics and Gynecology  [address] [place] [address]  Phone: [phone]  Fax: [phone]  Follow Up Time: Routine

## 2023-04-16 NOTE — DISCHARGE NOTE OB - MATERIALS PROVIDED
Vaccinations/[facility]  Screening Program/  Immunization Record/Breastfeeding Log/Bottle Feeding Log/Breastfeeding Mother’s Support Group Information/Guide to Postpartum Care/[facility] Hearing Screen Program/Shaken Baby Prevention Handout/Breastfeeding Guide and Packet/Birth Certificate Instructions/Discharge Medication Information for Patients and Families Pocket Guide/Tdap Vaccination (VIS Pub Date: 2012)

## 2023-04-16 NOTE — OB PROVIDER H&P - ASSESSMENT
A&P: Pt is a 23y/o  at 41w2d admitted for LT IOL.  Labor: admit to L&D  -PO cytotec for induction. Pt declining CB at this time  -routine labs  -EFM/toco  -NPO, IV hydration  Fetal: cat 1 tracing, fetal status reassuring  GBS: neg  Analgesia: epidural upon pt request    D/w Dr. Britney Sher PGY1

## 2023-04-16 NOTE — OB RN DELIVERY SUMMARY - NSPROCMANEUVERSA_OBGYN_ALL_OB
Suprapubic pressure/Audie (Legs flexed back) Suprapubic pressure/Audie (Legs flexed back)/Fundal pressure NOT applied

## 2023-04-16 NOTE — DISCHARGE NOTE OB - MEDICATION SUMMARY - MEDICATIONS TO TAKE
I will START or STAY ON the medications listed below when I get home from the hospital:    ibuprofen 600 mg oral tablet  -- 1 tab(s) by mouth every 6 hours, As Needed  -- Indication: For vaginal delivery    acetaminophen 325 mg oral tablet  -- 3 tab(s) by mouth every 6 hours as needed for -  -- Indication: For vaginal delivery

## 2023-04-16 NOTE — OB RN DELIVERY SUMMARY - NS_SEPSISRSKCALC_OBGYN_ALL_OB_FT
EOS calculated successfully. EOS Risk Factor: 0.5/1000 live births ([facility] national incidence); GA=41w2d; Temp=99.5; ROM=3.283; GBS='Negative'; Antibiotics='No antibiotics or any antibiotics < 2 hrs prior to birth'

## 2023-04-16 NOTE — OB PROVIDER DELIVERY SUMMARY - NSPROVIDERDELIVERYNOTE_OBGYN_ALL_OB_FT
Patient already fully dilated and pushing.  The head delivered and turtling was noted.  A loose nuchal was easily reduced.  The right anterior shoulder was attempted to be delivered; however was unable to with normal maneuvers.   A shoulder dystocia was called.  Audie and suprapubic pressure applied without relief of the shoulder.  Pinon's corkscrew performed with relief of dystocia.  The right anterior shoulder delivered followed by the left posterior shoulder and body.  Cord immediately clamped and cut.  Infant handed to awaiting pediatric team.  Cord pH obtained.  Placenta delivered intact.  Bimanual exam performed with resolution of mild atony.  A second degree laceration was repaired with chromic.  Good hemostasis noted.    YAMIL Gray MD

## 2023-04-16 NOTE — OB PROVIDER LABOR PROGRESS NOTE - NS_SUBJECTIVE/OBJECTIVE_OBGYN_ALL_OB_FT
Pt seen and examined at bedside for CB placement. Indications, risks and benefits of CB explained with pt, pt amenable. Pt seen and examined at bedside for CB placement. Indications, risks and benefits of CB explained with pt, pt amenable.    Vital Signs Last 24 Hrs  T(C): 36.7 (16 Apr 2023 01:53), Max: 36.7 (16 Apr 2023 01:09)  T(F): 98.1 (16 Apr 2023 01:53), Max: 98.1 (16 Apr 2023 01:53)  HR: 91 (16 Apr 2023 01:53) (91 - 91)  BP: 117/73 (16 Apr 2023 01:53) (117/73 - 117/73)  BP(mean): --  RR: 18 (16 Apr 2023 01:53) (18 - 18)  SpO2: --

## 2023-04-16 NOTE — DISCHARGE NOTE OB - PLAN OF CARE
Nothing per vagina.   No tub soaking or heavy lifting. Nothing per vagina.   No tub soaking or heavy lifting.  Continue taking pain medications as needed  F/up in 6 weeks

## 2023-04-16 NOTE — OB RN PATIENT PROFILE - FALL HARM RISK - UNIVERSAL INTERVENTIONS
Bed in lowest position, wheels locked, appropriate side rails in place/Call bell, personal items and telephone in reach/Instruct patient to call for assistance before getting out of bed or chair/Non-slip footwear when patient is out of bed/[place] to call system/Physically safe environment - no spills, clutter or unnecessary equipment/Purposeful Proactive Rounding/Room/bathroom lighting operational, light cord in reach

## 2023-04-16 NOTE — OB PROVIDER H&P - NSLOWPPHRISK_OBGYN_A_OB_CAL
pt c/o right upper leg pain s/p mechanical fall around 2130, denies head trauma, states "I don't know if I lost consciousness." No blood thinners. Denies f/c, n/v/d, dizziness/lightheadedness. 3

## 2023-04-16 NOTE — OB PROVIDER H&P - NSHPPHYSICALEXAM_GEN_ALL_CORE
Gen: well appearing, NAD  Abd: soft, gravid, non-tender  EFH: baseline 140/mod/+accels/-decels; reactive NST  [place]: ctx q20 min  VE: 1.5/50/-3  TAUS: vertex

## 2023-04-16 NOTE — OB PROVIDER LABOR PROGRESS NOTE - NS_SUBJECTIVE/OBJECTIVE_OBGYN_ALL_OB_FT
patient seen at bedside  states she is feeling well with some discomfort with contractions but does not desire epidural at this time    patient and family questions regarding induction  Patient counseled and questions answered to apparent satisfaction  Cont. JAE Meza, DO

## 2023-04-16 NOTE — OB PROVIDER H&P - HISTORY OF PRESENT ILLNESS
R1 H&P    Pt is a 23y/o  at 41w2d admitted for LT IOL. -CTX, -LOF, +FM. +Intermittent spotting for the last few days after membranes were stripped in the office this week.  Prenatal course c/b subchorionic hematoma that resolved. IVF pregnancy. Pt reports low fluid on last sono (MICHAEL 6 per pt).  GBS negative  EFW 3316    OBHx: G1- 2020- , 9#5, uncomplicated per pt  GynHx: denies h/o abnormal paps, STI's, fibroids, cysts  PMHx: scoliosis - pt was able to get epidural for prior delivery without issue.  PSHx: denies  Med: PNV  All: NKDA  SH: denies alcohol, tobacco, or drug use  Psych: denies h/o anxiety or depression

## 2023-04-16 NOTE — OB RN DELIVERY SUMMARY - NSSELHIDDEN_OBGYN_ALL_OB_FT
[NS_DeliveryAttending1_OBGYN_ALL_OB_FT:[id number]=],[NS_DeliveryAttending2_OBGYN_ALL_OB_FT:[id number]=],[NS_DeliveryRN_OBGYN_ALL_OB_FT:[id number]=],[NS_CirculateRN2_OBGYN_ALL_OB_FT:[id number]]

## 2023-04-16 NOTE — OB PROVIDER H&P - NSINFECTIONS_OBGYN_ALL_OB
Scribe Attestation (For Scribes USE Only)... Scribe Attestation (For Scribes USE Only).../Attending Attestation (For Attendings USE Only)... Unknown at this time

## 2023-04-17 ENCOUNTER — APPOINTMENT (OUTPATIENT)
Dept: OBGYN | Facility: CLINIC | Age: 23
End: 2023-04-17

## 2023-04-17 ENCOUNTER — NON-APPOINTMENT (OUTPATIENT)
Age: 23
End: 2023-04-17

## 2023-04-17 ENCOUNTER — APPOINTMENT (OUTPATIENT)
Dept: ANTEPARTUM | Facility: CLINIC | Age: 23
End: 2023-04-17

## 2023-04-17 RX ORDER — SENNA PLUS 8.6 MG/1
1 TABLET ORAL
Refills: 0 | Status: DISCONTINUED | OUTPATIENT
Start: 2023-04-17 | End: 2023-04-18

## 2023-04-17 RX ORDER — IBUPROFEN 200 MG
600 TABLET ORAL EVERY 6 HOURS
Refills: 0 | Status: DISCONTINUED | OUTPATIENT
Start: 2023-04-17 | End: 2023-04-18

## 2023-04-17 RX ADMIN — PRAMOXINE HYDROCHLORIDE 1 APPLICATION(S): 150 AEROSOL, FOAM RECTAL at 01:04

## 2023-04-17 RX ADMIN — Medication 1 TABLET(S): at 10:56

## 2023-04-17 RX ADMIN — Medication 600 MILLIGRAM(S): at 05:03

## 2023-04-17 RX ADMIN — SODIUM CHLORIDE 3 MILLILITER(S): 9 INJECTION INTRAMUSCULAR; INTRAVENOUS; SUBCUTANEOUS at 15:13

## 2023-04-17 RX ADMIN — Medication 1 APPLICATION(S): at 01:03

## 2023-04-17 RX ADMIN — Medication 975 MILLIGRAM(S): at 14:30

## 2023-04-17 RX ADMIN — Medication 975 MILLIGRAM(S): at 21:00

## 2023-04-17 RX ADMIN — Medication 600 MILLIGRAM(S): at 04:41

## 2023-04-17 RX ADMIN — Medication 975 MILLIGRAM(S): at 00:47

## 2023-04-17 RX ADMIN — Medication 600 MILLIGRAM(S): at 23:03

## 2023-04-17 RX ADMIN — AER TRAVELER 1 APPLICATION(S): 0.5 SOLUTION RECTAL; TOPICAL at 01:04

## 2023-04-17 RX ADMIN — SODIUM CHLORIDE 3 MILLILITER(S): 9 INJECTION INTRAMUSCULAR; INTRAVENOUS; SUBCUTANEOUS at 05:03

## 2023-04-17 RX ADMIN — Medication 975 MILLIGRAM(S): at 08:02

## 2023-04-17 RX ADMIN — Medication 975 MILLIGRAM(S): at 20:00

## 2023-04-17 RX ADMIN — Medication 975 MILLIGRAM(S): at 15:13

## 2023-04-17 RX ADMIN — Medication 600 MILLIGRAM(S): at 17:22

## 2023-04-17 RX ADMIN — Medication 975 MILLIGRAM(S): at 00:57

## 2023-04-17 RX ADMIN — Medication 600 MILLIGRAM(S): at 11:30

## 2023-04-17 RX ADMIN — SODIUM CHLORIDE 3 MILLILITER(S): 9 INJECTION INTRAMUSCULAR; INTRAVENOUS; SUBCUTANEOUS at 22:49

## 2023-04-17 RX ADMIN — Medication 600 MILLIGRAM(S): at 18:01

## 2023-04-17 RX ADMIN — SENNA PLUS 1 TABLET(S): 8.6 TABLET ORAL at 17:22

## 2023-04-17 RX ADMIN — Medication 600 MILLIGRAM(S): at 10:55

## 2023-04-17 RX ADMIN — Medication 975 MILLIGRAM(S): at 08:32

## 2023-04-17 NOTE — ANESTHESIA FOLLOW-UP NOTE - NSEVALATIONFT_GEN_ALL_CORE
Patient has been ambulating, voiding. Patient states no post dural puncture headache, nausea, or vomiting. No apparent complications with anesthesia care.

## 2023-04-17 NOTE — OB NEONATOLOGY/PEDIATRICIAN DELIVERY SUMMARY - NSPEDSNEONOTESA_OBGYN_ALL_OB_FT
Peds called for NRFHR. 41+2wk male born via  to a 22 y/o  blood type AB+ mother. Prenatal history of IVF pregnancy, oligohydramnios. No significant maternal history. PNL -/-/NR/I, GBS - on 3/13. SROM at 16:00 on  with clear fluids. Baby emerged with poor color and tone, was w/d/s/s with APGARS of 6/9. CPAP given from 4-5MOL for poor color. Birth events: R shoulder. Mom plans to initiate breastfeeding, consents Hep B vaccine and declines circ. EOS 0.26. Highest maternal temp 37.5.    BW: 3910g   : 23  TOB: 19:17    Physical Exam:  Gen: NAD, +grimace  HEENT: anterior fontanel open soft and flat, no cleft lip/palate, ears normal set, no ear pits or tags. no lesions in mouth/throat, nares clinically patent  Resp: no increased work of breathing, good air entry b/l, clear to auscultation bilaterally  Cardio: Normal S1/S2, regular rate and rhythm, no murmurs, rubs or gallops  Abd: soft, non tender, non distended, + bowel sounds, umbilical cord with 3 vessels  Neuro: +grasp/suck/jess, normal tone  Extremities: negative leon and ortolani, moving all extremities, full range of motion x 4, no crepitus  Skin: +sacral dimple with base, pink, warm  Genitals: Normal male anatomy, testicles palpable in scrotum b/l, Earl 1, anus patent Peds called for NRFHR. 41+2wk male born via  to a 24 y/o  blood type AB+ mother. Prenatal history of IVF pregnancy, oligohydramnios. No significant maternal history. PNL -/-/NR/I, GBS - on 3/13. SROM at 16:00 on  with clear fluids. Baby emerged with poor color and tone, was w/d/s/s with APGARS of 6/9. CPAP given from 4-5MOL for poor color. Birth events: nuchal x1, R shoulder. Mom plans to initiate breastfeeding, consents Hep B vaccine and declines circ. EOS 0.26. Highest maternal temp 37.5.    BW: 3910g   : 22  TOB: 19:17    Physical Exam:  Gen: NAD, +grimace  HEENT: anterior fontanel open soft and flat, no cleft lip/palate, ears normal set, no ear pits or tags. no lesions in mouth/throat, nares clinically patent  Resp: no increased work of breathing, good air entry b/l, clear to auscultation bilaterally  Cardio: Normal S1/S2, regular rate and rhythm, no murmurs, rubs or gallops  Abd: soft, non tender, non distended, + bowel sounds, umbilical cord with 3 vessels  Neuro: +grasp/suck/jess, normal tone  Extremities: negative leon and ortolani, moving all extremities, full range of motion x 4, no crepitus  Skin: +sacral dimple with base, pink, warm  Genitals: Normal male anatomy, testicles palpable in scrotum b/l, Earl 1, anus patent

## 2023-04-18 VITALS
DIASTOLIC BLOOD PRESSURE: 77 MMHG | RESPIRATION RATE: 17 BRPM | SYSTOLIC BLOOD PRESSURE: 120 MMHG | OXYGEN SATURATION: 96 % | HEART RATE: 87 BPM | TEMPERATURE: 98 F

## 2023-04-18 RX ORDER — TETANUS TOXOID, REDUCED DIPHTHERIA TOXOID AND ACELLULAR PERTUSSIS VACCINE, ADSORBED 5; 2.5; 8; 8; 2.5 [IU]/.5ML; [IU]/.5ML; UG/.5ML; UG/.5ML; UG/.5ML
0.5 SUSPENSION INTRAMUSCULAR ONCE
Refills: 0 | Status: COMPLETED | OUTPATIENT
Start: 2023-04-18 | End: 2023-04-18

## 2023-04-18 RX ADMIN — Medication 1 TABLET(S): at 11:40

## 2023-04-18 RX ADMIN — Medication 600 MILLIGRAM(S): at 12:40

## 2023-04-18 RX ADMIN — SENNA PLUS 1 TABLET(S): 8.6 TABLET ORAL at 05:08

## 2023-04-18 RX ADMIN — Medication 600 MILLIGRAM(S): at 06:00

## 2023-04-18 RX ADMIN — Medication 975 MILLIGRAM(S): at 08:53

## 2023-04-18 RX ADMIN — SODIUM CHLORIDE 3 MILLILITER(S): 9 INJECTION INTRAMUSCULAR; INTRAVENOUS; SUBCUTANEOUS at 14:49

## 2023-04-18 RX ADMIN — TETANUS TOXOID, REDUCED DIPHTHERIA TOXOID AND ACELLULAR PERTUSSIS VACCINE, ADSORBED 0.5 MILLILITER(S): 5; 2.5; 8; 8; 2.5 SUSPENSION INTRAMUSCULAR at 06:30

## 2023-04-18 RX ADMIN — Medication 975 MILLIGRAM(S): at 14:57

## 2023-04-18 RX ADMIN — Medication 600 MILLIGRAM(S): at 00:00

## 2023-04-18 RX ADMIN — Medication 975 MILLIGRAM(S): at 01:46

## 2023-04-18 RX ADMIN — Medication 600 MILLIGRAM(S): at 18:03

## 2023-04-18 RX ADMIN — SENNA PLUS 1 TABLET(S): 8.6 TABLET ORAL at 18:03

## 2023-04-18 RX ADMIN — Medication 600 MILLIGRAM(S): at 11:40

## 2023-04-18 RX ADMIN — Medication 600 MILLIGRAM(S): at 18:30

## 2023-04-18 RX ADMIN — Medication 975 MILLIGRAM(S): at 02:30

## 2023-04-18 RX ADMIN — Medication 975 MILLIGRAM(S): at 15:43

## 2023-04-18 NOTE — PROGRESS NOTE ADULT - SUBJECTIVE AND OBJECTIVE BOX
Pt seen by Dr Crandall who states pt is cleared for discharge  T(C): 36.7 (23 @ 05:42), Max: 36.8 (23 @ 17:55)  HR: 84 (23 @ 05:42) (75 - 84)  BP: 123/79 (23 @ 05:42) (123/79 - 125/79)  RR: 18 (23 @ 05:42) (17 - 18)  SpO2: 99% (23 @ 05:42) (99% - 100%)          pp day #2 s/p  - stable  d.c home  L MD Frankie 
S: Patient doing well. No complaints. Minimal lochia. Pain controlled.    O: Vital Signs Last 24 Hrs  T(C): 36.3 (2023 09:32), Max: 37.1 (2023 00:15)  T(F): 97.3 (2023 09:32), Max: 98.8 (2023 00:15)  HR: 80 (:32) (69 - 169)  BP: 110/63 (2023 09:32) (97/53 - 123/74)  BP(mean): --  RR: 18 (2023 09:32) (16 - 18)  SpO2: 99% (:32) (86% - 100%)    Parameters below as of 2023 05:22  Patient On (Oxygen Delivery Method): room air        Gen: NAD  Abd: soft, Nontender, Nondistended, fundus firm  Ext: no tendern, mild edema    Labs:                        12.8   11.25 )-----------( 194      ( 2023 01:10 )             39.7       A: 22y POD#1 s/p  doing well.    Plan:  Routine postpartum care  Encouraged out of bed  Regular diet

## 2023-04-18 NOTE — PROVIDER CONTACT NOTE (OTHER) - BACKGROUND
NSD from 4/16@1917 41.2 2002 [facility] 537
 of viable male @191.  w 2nd degree. Pt began eating and PO hydrating at 1hr myesha post del. When standing pt became light headed/dizzy and tachycardic. Firm @ umbilicus w light bleeding.

## 2023-04-18 NOTE — PROVIDER CONTACT NOTE (OTHER) - SITUATION
Pt had positive orthostatics x1 @2135.
while going over discharge instruction, pt states feeling " slight numbing, tingling and heaviness on the top of my right foot"

## 2023-04-18 NOTE — PROVIDER CONTACT NOTE (OTHER) - ACTION/TREATMENT ORDERED:
MD Foreman notified. Pt to continue PO hydration and receive 1L bolus of fluids. Will rpt orthostatics post bolus and notify MD of results.
MD made aware. Resident Sinks will come assess pt.

## 2023-04-18 NOTE — PROVIDER CONTACT NOTE (OTHER) - RECOMMENDATIONS
instructed pt to elevate legs while in bed.
Informed pt to continue PO hydration. MD Foreman notified. Pt to continue PO hydration and receive 1L bolus of fluids. Will rpt orthostatics post bolus.

## 2023-04-18 NOTE — PROVIDER CONTACT NOTE (OTHER) - ASSESSMENT
pt OOB independently, right foot slightly edematous than the left foot. Pt stated she noticed the sensation post delivery, without any new changes. Pt reports its felt more upon ambulation.
Lying bp 97/53 HR 88, Sitting bp 105/61 HR 90. When standing pt became light headed, dizzy and tachycardic in 130s. Firm and @ umbilicus w light bleeding. Immediately sat pt down and notified MD.

## 2023-05-30 ENCOUNTER — APPOINTMENT (OUTPATIENT)
Dept: OBGYN | Facility: CLINIC | Age: 23
End: 2023-05-30
Payer: MEDICAID

## 2023-05-30 VITALS — SYSTOLIC BLOOD PRESSURE: 110 MMHG | WEIGHT: 155 LBS | DIASTOLIC BLOOD PRESSURE: 74 MMHG

## 2023-05-30 PROCEDURE — 0503F POSTPARTUM CARE VISIT: CPT

## 2023-05-30 NOTE — HISTORY OF PRESENT ILLNESS
[Complications:___] : no complications [Delivery Date: ___] : on [unfilled] [] : delivered by vaginal delivery [Male] : Delivery History: baby boy [Wt. ___] : weighing [unfilled] [Breastfeeding] : not currently nursing [Back to Normal] : is back to normal in size [None] : no vaginal bleeding [Normal] : the vagina was normal [Healing Well] : is healing well [Examination Of The Breasts] : breasts are normal [Doing Well] : is doing well [No Sign of Infection] : is showing no signs of infection [Excellent Pain Control] : has excellent pain control [FreeTextEntry8] : HPI: 23 y/o F s/p  on 2023 presenting for postpartum visit. Liveborn male. She is bottle. She is feeling well and is without complaints. Her mood is stable. [de-identified] : PP exam WNL [de-identified] : 21 y/o F presenting for 6 week PP visit\par -normal PP exam\par -Patient cleared to resume intercourse and exercise\par -Patient counseled on contraception options, desires no BC, IVF pregnancy and if she conceives on her own she would be happy,\par -f/u for 3 months for annual

## 2023-07-17 RX ORDER — NORETHINDRONE ACETATE 5 MG/1
5 TABLET ORAL DAILY
Qty: 30 | Refills: 0 | Status: ACTIVE | COMMUNITY
Start: 2023-07-17 | End: 1900-01-01

## 2023-08-27 ENCOUNTER — EMERGENCY (EMERGENCY)
Facility: HOSPITAL | Age: 23
LOS: 1 days | Discharge: HOME | End: 2023-08-27
Admitting: EMERGENCY MEDICINE
Payer: MEDICAID

## 2023-08-27 VITALS
TEMPERATURE: 98 F | OXYGEN SATURATION: 100 % | RESPIRATION RATE: 17 BRPM | HEART RATE: 78 BPM | DIASTOLIC BLOOD PRESSURE: 85 MMHG | SYSTOLIC BLOOD PRESSURE: 119 MMHG

## 2023-08-27 PROCEDURE — 99284 EMERGENCY DEPT VISIT MOD MDM: CPT

## 2023-08-27 NOTE — ED ADULT TRIAGE NOTE - CHIEF COMPLAINT QUOTE
Pt c/o of pink eye to both eyes. States she was seen at urgent care and was told to come to ER for steroid eye drops. No distress noted, pt appearing well.

## 2023-08-27 NOTE — ED PROVIDER NOTE - OBJECTIVE STATEMENT
22 yo F with no PMH presents to ED c/o worsening of right eye swelling x2 days. Reports she has redness to right eye last week, seen at urgent care with Polytrim eye drops, and has followed up with Ophthalmologist. Admits having fever for 4 days, was tested positive with Adenovirus with her PCP. Admits pain in right eye, intermittent, with palpation and movements. Admits her ophthalmologist's office is closed, advised to go ED to see an ophthalmologist. Denies any visual changes, floaters, flashes, photophobia, or any other complaints.

## 2023-08-27 NOTE — ED PROVIDER NOTE - PRO INTERPRETER NEED 2
English Methotrexate Counseling:  Patient counseled regarding adverse effects of methotrexate including but not limited to nausea, vomiting, abnormalities in liver function tests. Patients may develop mouth sores, rash, diarrhea, and abnormalities in blood counts. The patient understands that monitoring is required including LFT's and blood counts.  There is a rare possibility of scarring of the liver and lung problems that can occur when taking methotrexate. Persistent nausea, loss of appetite, pale stools, dark urine, cough, and shortness of breath should be reported immediately. Patient advised to discontinue methotrexate treatment at least three months before attempting to become pregnant.  I discussed the need for folate supplements while taking methotrexate.  These supplements can decrease side effects during methotrexate treatment. The patient verbalized understanding of the proper use and possible adverse effects of methotrexate.  All of the patient's questions and concerns were addressed.

## 2023-08-27 NOTE — ED PROVIDER NOTE - NSFOLLOWUPINSTRUCTIONS_ED_ALL_ED_FT
Rest, drink plenty of fluids.  Advance activity as tolerated.  Continue all previously prescribed medications as directed.  Follow up with your primary care physician and ophthalmologist in 48-72 hours- bring copies of your results.  Return to the ER for worsening or persistent symptoms, and/or ANY NEW OR CONCERNING SYMPTOMS. If you have issues obtaining follow up, please call: [phone] ([phone] to obtain a doctor or specialist who takes your insurance in your area.     Stop Polytrim eye drops.   Apply cool compresses for 15 minutes to affected area, 3-4 times per day.

## 2023-08-27 NOTE — ED PROVIDER NOTE - PATIENT PORTAL LINK FT
You can access the FollowMyHealth Patient Portal offered by [facility] by registering at the following website: [email or web address]. By joining [employer]’s FollowMyHealth portal, you will also be able to view your health information using other applications (apps) compatible with our system.

## 2023-08-27 NOTE — ED PROVIDER NOTE - CLINICAL SUMMARY MEDICAL DECISION MAKING FREE TEXT BOX
22 yo F with no PMH presents to ED c/o worsening of right eye swelling x2 days. well appearing female. +adenovirus swab outpatient w/ PCP. Seen by ophthalmologist outpatient, started on Polytrim from urgent care initially.  Likely viral conjunctivitis. low concern for corneal abrasion. lower concern for preseptal cellulitis. Check acuity, fluorescein stain, slitlamp exam, and reassess, likely discharge with opthalmology follow up.

## 2023-08-27 NOTE — ED PROVIDER NOTE - PROGRESS NOTE DETAILS
BOBBY CAGE: spoke with ophthalmologist, given pt's positive adenovirus, no indication for steroid drops, recommend cold compress, stop Polytrim eye drops, likely improve in 2 weeks.

## 2023-09-07 ENCOUNTER — APPOINTMENT (OUTPATIENT)
Dept: OBGYN | Facility: CLINIC | Age: 23
End: 2023-09-07
Payer: MEDICAID

## 2023-09-07 VITALS — DIASTOLIC BLOOD PRESSURE: 70 MMHG | WEIGHT: 153 LBS | SYSTOLIC BLOOD PRESSURE: 106 MMHG

## 2023-09-07 DIAGNOSIS — Z01.419 ENCOUNTER FOR GYNECOLOGICAL EXAMINATION (GENERAL) (ROUTINE) W/OUT ABNORMAL FINDINGS: ICD-10-CM

## 2023-09-07 PROCEDURE — 99395 PREV VISIT EST AGE 18-39: CPT

## 2023-09-07 RX ORDER — ASCORBIC ACID, CHOLECALCIFEROL, .ALPHA.-TOCOPHEROL ACETATE, DL-, PYRIDOXINE, FOLIC ACID, CYANOCOBALAMIN, CALCIUM, FERROUS FUMARATE, MAGNESIUM, DOCONEXENT 85; 200; 10; 25; 1; 12; 140; 27; 45; 300 [IU]/1; [IU]/1; [IU]/1; [IU]/1; MG/1; UG/1; MG/1; MG/1; MG/1; MG/1
27-0.6-0.4-3 CAPSULE, GELATIN COATED ORAL
Qty: 1 | Refills: 3 | Status: ACTIVE | COMMUNITY
Start: 2023-09-07 | End: 1900-01-01

## 2023-09-07 NOTE — HISTORY OF PRESENT ILLNESS
[Y] : Positive pregnancy history [PGHxTotal] : 2 [facility] 2 [FreeTextEntry1] :  x2  [Currently Active] : currently active [Men] : men [No] : No

## 2023-09-07 NOTE — PLAN
[FreeTextEntry1] : 23 year old P2 presenting for annual exam. -uterus is normal sized and mobile -f/u PAP and GC/CT done today -contraception: trying to conceive  -pt to continue monitoring breakthrough bleeding, will RTO for sono if needed  -skin tag noted on L thigh 1mm in size, will continue to observe  -f/u PRN

## 2023-09-09 LAB
C TRACH RRNA SPEC QL NAA+PROBE: NOT DETECTED
N GONORRHOEA RRNA SPEC QL NAA+PROBE: NOT DETECTED
SOURCE AMPLIFICATION: NORMAL

## 2023-09-12 LAB — CYTOLOGY CVX/VAG DOC THIN PREP: NORMAL

## 2024-01-19 NOTE — ED ADULT TRIAGE NOTE - HEIGHT IN FEET
Procedure:  [] Caudal Epidural Steroid Injection [] Lumbar Sympathetic Block [] Hip Joint Injection    [x] Medial Branch Block [] Epidural Steroid Injection [] Paracoccyx Injection    [] Facet Joint Injection [] Selective Spinal Nerve Injection [] Lumber Medial Branch Block      [] Intercostal Nerve Block   [] SI Joint Injection      [] Transforaminal Epidural Steroid    [] Subacromial Bursa Injection [] Piriformis Muscle Injection [] Other:            Post Procedure Instructions:   Activity:  DO NOT work, drive a car, stay alone, or operate machinery or electrical/power tools or appliances today.  Activity as tolerated.  Resume your pre-procedure activity or restrictions tomorrow.    Dressing/Incision Site:   Keep injection site clean and dry.  You may shower/bathe tomorrow.  You may use an ice pack at the injection site for the next 24 hours while awake.  The ice pack should only be used for 20 minutes every 2 hours.    Special Instructions:   DO NOT DRINK ALCOHOL TODAY due to the medication given during the procedure.  Resume your pre-procedure diet.  Continue your medications unless otherwise instructed.  You will most likely have continued pain after the procedure; continue your usual pain medications unless otherwise instructed.  [] For Diabetics who received a Steroid Injection:  Diabetic patients may experience higher glucose levels after a steroid injection.  Diabetic patients should monitor their blood sugar for at least one week after injection.  Insulin or medication adjustment may be necessary.  Please contact your primary care provider with any questions/concerns.    Follow-up Instructions:  [] For Epidural Steroid Injections: If within 1 week of the injection you notice significant relief, but not complete relief of pain, a second injection should be scheduled. The second injection should be 2 weeks after the first injection.   [x] If instructed, call the office to report your pain relief.  Please  keep follow up appointment, if one is scheduled.      Call [phone] if you develop any of the following:  Drainage, increase in redness, and/or swelling from the injection site.  Temperature above 101oF.  Increased numbness or weakness of your legs or arms different than before the injection.  Severe headache.               5

## 2024-03-12 ENCOUNTER — APPOINTMENT (OUTPATIENT)
Dept: HUMAN REPRODUCTION | Facility: CLINIC | Age: 24
End: 2024-03-12
Payer: SELF-PAY

## 2024-03-12 PROCEDURE — 99215 OFFICE O/P EST HI 40 MIN: CPT

## 2024-03-12 PROCEDURE — 36415 COLL VENOUS BLD VENIPUNCTURE: CPT

## 2024-03-14 ENCOUNTER — APPOINTMENT (OUTPATIENT)
Dept: HUMAN REPRODUCTION | Facility: CLINIC | Age: 24
End: 2024-03-14
Payer: SELF-PAY

## 2024-03-14 PROCEDURE — 58340 CATHETER FOR HYSTEROGRAPHY: CPT

## 2024-03-14 PROCEDURE — 58999I: CUSTOM

## 2024-03-14 PROCEDURE — 99214 OFFICE O/P EST MOD 30 MIN: CPT | Mod: 25

## 2024-03-14 PROCEDURE — 76831 ECHO EXAM UTERUS: CPT

## 2024-03-27 NOTE — OB RN PATIENT PROFILE - NS_PRENATALCARE_OBGYN_ALL_OB
Lab Results   Component Value Date    HGBA1C 6.7 (H) 03/16/2024     Controlled with A1c of 6.7%.  He was encouraged to continue good work managing his diabetes.  He has tolerated current regimen which will be continued, Jardiance 25 mg daily, metformin 1000 mg daily and Ozempic 1 mg weekly.  He was instructed to continue checking his blood sugar once or twice a day and bring his sugar log to next visit.  Return back in 3 months.  Labs prior to next visit.  
Continue Crestor.    
We reviewed the importance of glycemic and hypertension control, currently on SGLT2i, as well as ARB's, which will be continued.    
Well-controlled, 120/70.  Continue irbesartan.    
Yes

## 2024-04-16 NOTE — OB RN PATIENT PROFILE - NS MD HP INPLANTS MED DEV
Quality 431: Preventive Care And Screening: Unhealthy Alcohol Use - Screening: Patient not identified as an unhealthy alcohol user when screened for unhealthy alcohol use using a systematic screening method Quality 226: Preventive Care And Screening: Tobacco Use: Screening And Cessation Intervention: Patient screened for tobacco use and is an ex/non-smoker Detail Level: Simple None

## 2024-04-18 NOTE — OB PROVIDER TRIAGE NOTE - NS_FHRDECEL_OBGYN_ALL_OB
[facility]  H&P  Name: Sherin Liriano [facility])  No Decelerations

## 2024-05-10 ENCOUNTER — APPOINTMENT (OUTPATIENT)
Dept: HUMAN REPRODUCTION | Facility: CLINIC | Age: 24
End: 2024-05-10
Payer: SELF-PAY

## 2024-05-10 PROCEDURE — S4042: CPT

## 2024-05-10 PROCEDURE — 76830 TRANSVAGINAL US NON-OB: CPT

## 2024-05-10 PROCEDURE — 36415 COLL VENOUS BLD VENIPUNCTURE: CPT

## 2024-05-10 PROCEDURE — 99213 OFFICE O/P EST LOW 20 MIN: CPT | Mod: 25

## 2024-05-16 ENCOUNTER — APPOINTMENT (OUTPATIENT)
Dept: HUMAN REPRODUCTION | Facility: CLINIC | Age: 24
End: 2024-05-16
Payer: COMMERCIAL

## 2024-05-16 PROCEDURE — 76857 US EXAM PELVIC LIMITED: CPT

## 2024-05-16 PROCEDURE — 36415 COLL VENOUS BLD VENIPUNCTURE: CPT

## 2024-05-16 PROCEDURE — 99213 OFFICE O/P EST LOW 20 MIN: CPT | Mod: 25

## 2024-05-21 ENCOUNTER — APPOINTMENT (OUTPATIENT)
Dept: HUMAN REPRODUCTION | Facility: CLINIC | Age: 24
End: 2024-05-21
Payer: COMMERCIAL

## 2024-05-21 PROCEDURE — 99213 OFFICE O/P EST LOW 20 MIN: CPT | Mod: 25

## 2024-05-21 PROCEDURE — 36415 COLL VENOUS BLD VENIPUNCTURE: CPT

## 2024-05-21 PROCEDURE — 76857 US EXAM PELVIC LIMITED: CPT

## 2024-05-24 ENCOUNTER — APPOINTMENT (OUTPATIENT)
Dept: HUMAN REPRODUCTION | Facility: CLINIC | Age: 24
End: 2024-05-24
Payer: COMMERCIAL

## 2024-05-24 PROCEDURE — 99213 OFFICE O/P EST LOW 20 MIN: CPT | Mod: 25

## 2024-05-24 PROCEDURE — 36415 COLL VENOUS BLD VENIPUNCTURE: CPT

## 2024-05-24 PROCEDURE — 76857 US EXAM PELVIC LIMITED: CPT

## 2024-05-29 ENCOUNTER — APPOINTMENT (OUTPATIENT)
Dept: HUMAN REPRODUCTION | Facility: CLINIC | Age: 24
End: 2024-05-29
Payer: COMMERCIAL

## 2024-05-29 PROCEDURE — 89398A: CUSTOM

## 2024-05-29 PROCEDURE — 58974 EMBRYO TRANSFER INTRAUTERINE: CPT

## 2024-05-29 PROCEDURE — 89255 PREPARE EMBRYO FOR TRANSFER: CPT

## 2024-05-29 PROCEDURE — 89352 THAWING CRYOPRESRVED EMBRYO: CPT

## 2024-05-29 PROCEDURE — 76998 US GUIDE INTRAOP: CPT

## 2024-05-30 ENCOUNTER — APPOINTMENT (OUTPATIENT)
Dept: HUMAN REPRODUCTION | Facility: CLINIC | Age: 24
End: 2024-05-30
Payer: COMMERCIAL

## 2024-05-31 ENCOUNTER — APPOINTMENT (OUTPATIENT)
Dept: HUMAN REPRODUCTION | Facility: CLINIC | Age: 24
End: 2024-05-31
Payer: COMMERCIAL

## 2024-05-31 PROCEDURE — 99213 OFFICE O/P EST LOW 20 MIN: CPT

## 2024-06-10 ENCOUNTER — APPOINTMENT (OUTPATIENT)
Dept: HUMAN REPRODUCTION | Facility: CLINIC | Age: 24
End: 2024-06-10
Payer: COMMERCIAL

## 2024-06-10 PROCEDURE — 36415 COLL VENOUS BLD VENIPUNCTURE: CPT

## 2024-06-12 ENCOUNTER — APPOINTMENT (OUTPATIENT)
Dept: HUMAN REPRODUCTION | Facility: CLINIC | Age: 24
End: 2024-06-12

## 2024-06-14 ENCOUNTER — APPOINTMENT (OUTPATIENT)
Dept: HUMAN REPRODUCTION | Facility: CLINIC | Age: 24
End: 2024-06-14
Payer: SELF-PAY

## 2024-06-14 PROCEDURE — 36415 COLL VENOUS BLD VENIPUNCTURE: CPT

## 2024-06-18 ENCOUNTER — APPOINTMENT (OUTPATIENT)
Dept: HUMAN REPRODUCTION | Facility: CLINIC | Age: 24
End: 2024-06-18
Payer: SELF-PAY

## 2024-06-18 PROCEDURE — 36415 COLL VENOUS BLD VENIPUNCTURE: CPT

## 2024-06-18 PROCEDURE — 76817 TRANSVAGINAL US OBSTETRIC: CPT

## 2024-06-18 PROCEDURE — 99213 OFFICE O/P EST LOW 20 MIN: CPT | Mod: 25

## 2024-06-18 PROCEDURE — 99459 PELVIC EXAMINATION: CPT

## 2024-06-25 ENCOUNTER — APPOINTMENT (OUTPATIENT)
Dept: HUMAN REPRODUCTION | Facility: CLINIC | Age: 24
End: 2024-06-25

## 2024-06-27 ENCOUNTER — APPOINTMENT (OUTPATIENT)
Dept: OBGYN | Facility: CLINIC | Age: 24
End: 2024-06-27
Payer: MEDICAID

## 2024-06-27 VITALS
BODY MASS INDEX: 22.07 KG/M2 | HEIGHT: 69 IN | WEIGHT: 149 LBS | SYSTOLIC BLOOD PRESSURE: 93 MMHG | DIASTOLIC BLOOD PRESSURE: 65 MMHG

## 2024-06-27 DIAGNOSIS — N91.2 AMENORRHEA, UNSPECIFIED: ICD-10-CM

## 2024-06-27 PROCEDURE — 99213 OFFICE O/P EST LOW 20 MIN: CPT

## 2024-06-29 LAB
APPEARANCE: CLEAR
BACTERIA UR CULT: NORMAL
BACTERIA: NEGATIVE /HPF
BILIRUBIN URINE: NEGATIVE
BLOOD URINE: NEGATIVE
C TRACH RRNA SPEC QL NAA+PROBE: NOT DETECTED
CAST: 0 /LPF
COLOR: YELLOW
EPITHELIAL CELLS: 1 /HPF
GLUCOSE QUALITATIVE U: NEGATIVE MG/DL
KETONES URINE: NEGATIVE MG/DL
LEUKOCYTE ESTERASE URINE: NEGATIVE
MICROSCOPIC-UA: NORMAL
N GONORRHOEA RRNA SPEC QL NAA+PROBE: NOT DETECTED
NITRITE URINE: NEGATIVE
PH URINE: 7.5
PROTEIN URINE: NEGATIVE MG/DL
RED BLOOD CELLS URINE: 2 /HPF
SOURCE AMPLIFICATION: NORMAL
SPECIFIC GRAVITY URINE: 1.01
UROBILINOGEN URINE: 0.2 MG/DL
WHITE BLOOD CELLS URINE: 0 /HPF

## 2024-07-01 ENCOUNTER — APPOINTMENT (OUTPATIENT)
Dept: OBGYN | Facility: CLINIC | Age: 24
End: 2024-07-01
Payer: COMMERCIAL

## 2024-07-01 ENCOUNTER — ASOB RESULT (OUTPATIENT)
Age: 24
End: 2024-07-01

## 2024-07-01 VITALS
BODY MASS INDEX: 21.48 KG/M2 | DIASTOLIC BLOOD PRESSURE: 78 MMHG | SYSTOLIC BLOOD PRESSURE: 118 MMHG | HEIGHT: 69 IN | WEIGHT: 145 LBS

## 2024-07-01 DIAGNOSIS — O21.9 VOMITING OF PREGNANCY, UNSPECIFIED: ICD-10-CM

## 2024-07-01 DIAGNOSIS — Z3A.26 26 WEEKS GESTATION OF PREGNANCY: ICD-10-CM

## 2024-07-01 DIAGNOSIS — Z33.1 PREGNANT STATE, INCIDENTAL: ICD-10-CM

## 2024-07-01 DIAGNOSIS — O02.1 MISSED ABORTION: ICD-10-CM

## 2024-07-01 DIAGNOSIS — Z3A.16 16 WEEKS GESTATION OF PREGNANCY: ICD-10-CM

## 2024-07-01 PROCEDURE — 99214 OFFICE O/P EST MOD 30 MIN: CPT

## 2024-07-03 ENCOUNTER — APPOINTMENT (OUTPATIENT)
Dept: ULTRASOUND IMAGING | Facility: CLINIC | Age: 24
End: 2024-07-03
Payer: COMMERCIAL

## 2024-07-03 ENCOUNTER — RESULT REVIEW (OUTPATIENT)
Age: 24
End: 2024-07-03

## 2024-07-03 ENCOUNTER — OUTPATIENT (OUTPATIENT)
Dept: OUTPATIENT SERVICES | Facility: HOSPITAL | Age: 24
LOS: 1 days | End: 2024-07-03
Payer: COMMERCIAL

## 2024-07-03 DIAGNOSIS — Z01.419 ENCOUNTER FOR GYNECOLOGICAL EXAMINATION (GENERAL) (ROUTINE) WITHOUT ABNORMAL FINDINGS: ICD-10-CM

## 2024-07-03 LAB
ABO + RH PNL BLD: NORMAL
BLD GP AB SCN SERPL QL: NORMAL
HCG SERPL-MCNC: 4225 MIU/ML

## 2024-07-03 PROCEDURE — 76830 TRANSVAGINAL US NON-OB: CPT

## 2024-07-03 PROCEDURE — 76830 TRANSVAGINAL US NON-OB: CPT | Mod: 26

## 2024-07-07 PROBLEM — Z3A.26 26 WEEKS GESTATION OF PREGNANCY: Status: RESOLVED | Noted: 2023-01-04 | Resolved: 2024-07-07

## 2024-07-07 PROBLEM — Z33.1 PREGNANCY, INCIDENTAL: Status: RESOLVED | Noted: 2022-09-21 | Resolved: 2024-07-07

## 2024-07-07 PROBLEM — O02.1 MISSED ABORTION: Status: ACTIVE | Noted: 2024-07-07

## 2024-07-07 PROBLEM — Z3A.16 16 WEEKS GESTATION OF PREGNANCY: Status: RESOLVED | Noted: 2022-10-26 | Resolved: 2024-07-07

## 2024-07-07 PROBLEM — O21.9 NAUSEA AND VOMITING OF PREGNANCY, ANTEPARTUM: Status: RESOLVED | Noted: 2022-09-09 | Resolved: 2024-07-07

## 2024-07-07 LAB — HCG SERPL-MCNC: 780 MIU/ML

## 2024-07-09 ENCOUNTER — APPOINTMENT (OUTPATIENT)
Dept: OBGYN | Facility: CLINIC | Age: 24
End: 2024-07-09

## 2024-07-09 ENCOUNTER — APPOINTMENT (OUTPATIENT)
Dept: ANTEPARTUM | Facility: CLINIC | Age: 24
End: 2024-07-09

## 2024-07-15 ENCOUNTER — APPOINTMENT (OUTPATIENT)
Dept: OBGYN | Facility: CLINIC | Age: 24
End: 2024-07-15

## 2024-07-15 ENCOUNTER — APPOINTMENT (OUTPATIENT)
Dept: ANTEPARTUM | Facility: CLINIC | Age: 24
End: 2024-07-15

## 2024-07-18 ENCOUNTER — APPOINTMENT (OUTPATIENT)
Dept: HUMAN REPRODUCTION | Facility: CLINIC | Age: 24
End: 2024-07-18
Payer: SELF-PAY

## 2024-07-18 PROCEDURE — 99215 OFFICE O/P EST HI 40 MIN: CPT | Mod: 25

## 2024-07-18 PROCEDURE — 36415 COLL VENOUS BLD VENIPUNCTURE: CPT

## 2024-07-18 PROCEDURE — 76830 TRANSVAGINAL US NON-OB: CPT

## 2024-09-17 ENCOUNTER — APPOINTMENT (OUTPATIENT)
Dept: OBGYN | Facility: CLINIC | Age: 24
End: 2024-09-17
Payer: COMMERCIAL

## 2024-09-17 VITALS
SYSTOLIC BLOOD PRESSURE: 119 MMHG | WEIGHT: 148 LBS | BODY MASS INDEX: 21.92 KG/M2 | HEIGHT: 69 IN | DIASTOLIC BLOOD PRESSURE: 77 MMHG

## 2024-09-17 PROCEDURE — 99213 OFFICE O/P EST LOW 20 MIN: CPT

## 2024-09-17 PROCEDURE — 99459 PELVIC EXAMINATION: CPT

## 2024-09-18 NOTE — PLAN
[FreeTextEntry1] : 24 yr old with small Bartholin cyst  - nontender to touch. no redness or pain  - recommended warm soaks to area  - f/u if enlarges or becomes tender or red - f/u for PAP and annual exam

## 2024-09-18 NOTE — HISTORY OF PRESENT ILLNESS
[FreeTextEntry1] : 24 yr old with c/o of nontender, non-painful small bump in her vagina.  [Currently Active] : currently active [Men] : men [Vaginal] : vaginal [No] : No [Patient refuses STI testing] : Patient refuses STI testing

## 2024-09-18 NOTE — PHYSICAL EXAM
[Chaperone Present] : A chaperone was present in the examining room during all aspects of the physical examination [92409] : A chaperone was present during the pelvic exam. [FreeTextEntry2] : [place] [Appropriately responsive] : appropriately responsive [Alert] : alert [No Acute Distress] : no acute distress [No Lymphadenopathy] : no lymphadenopathy [Soft] : soft [Non-tender] : non-tender [Non-distended] : non-distended [No HSM] : No HSM [No Lesions] : no lesions [No Mass] : no mass [Oriented x3] : oriented x3 [Labia Majora] : normal [Labia Minora] : normal [Normal] : normal [Uterine Adnexae] : normal [FreeTextEntry4] : small cystic structure on perineum

## 2024-09-24 ENCOUNTER — APPOINTMENT (OUTPATIENT)
Dept: OBGYN | Facility: CLINIC | Age: 24
End: 2024-09-24

## 2024-10-23 ENCOUNTER — APPOINTMENT (OUTPATIENT)
Dept: HUMAN REPRODUCTION | Facility: CLINIC | Age: 24
End: 2024-10-23
Payer: COMMERCIAL

## 2024-10-23 PROCEDURE — 99213 OFFICE O/P EST LOW 20 MIN: CPT | Mod: 25

## 2024-10-23 PROCEDURE — 99459 PELVIC EXAMINATION: CPT

## 2024-10-23 PROCEDURE — 36415 COLL VENOUS BLD VENIPUNCTURE: CPT

## 2024-10-23 PROCEDURE — 76857 US EXAM PELVIC LIMITED: CPT

## 2024-10-29 ENCOUNTER — APPOINTMENT (OUTPATIENT)
Dept: HUMAN REPRODUCTION | Facility: CLINIC | Age: 24
End: 2024-10-29
Payer: COMMERCIAL

## 2024-10-29 PROCEDURE — 76857 US EXAM PELVIC LIMITED: CPT

## 2024-10-29 PROCEDURE — 99213 OFFICE O/P EST LOW 20 MIN: CPT | Mod: 25

## 2024-10-29 PROCEDURE — 36415 COLL VENOUS BLD VENIPUNCTURE: CPT

## 2024-10-31 ENCOUNTER — APPOINTMENT (OUTPATIENT)
Dept: HUMAN REPRODUCTION | Facility: CLINIC | Age: 24
End: 2024-10-31
Payer: COMMERCIAL

## 2024-10-31 PROCEDURE — 36415 COLL VENOUS BLD VENIPUNCTURE: CPT

## 2024-10-31 PROCEDURE — 99213 OFFICE O/P EST LOW 20 MIN: CPT | Mod: 25

## 2024-10-31 PROCEDURE — 76857 US EXAM PELVIC LIMITED: CPT

## 2024-11-01 ENCOUNTER — APPOINTMENT (OUTPATIENT)
Dept: HUMAN REPRODUCTION | Facility: CLINIC | Age: 24
End: 2024-11-01
Payer: COMMERCIAL

## 2024-11-01 PROCEDURE — 36415 COLL VENOUS BLD VENIPUNCTURE: CPT

## 2024-11-04 ENCOUNTER — APPOINTMENT (OUTPATIENT)
Dept: HUMAN REPRODUCTION | Facility: CLINIC | Age: 24
End: 2024-11-04
Payer: COMMERCIAL

## 2024-11-04 PROCEDURE — 36415 COLL VENOUS BLD VENIPUNCTURE: CPT

## 2024-11-05 ENCOUNTER — APPOINTMENT (OUTPATIENT)
Dept: HUMAN REPRODUCTION | Facility: CLINIC | Age: 24
End: 2024-11-05
Payer: COMMERCIAL

## 2024-11-05 PROCEDURE — 36415 COLL VENOUS BLD VENIPUNCTURE: CPT

## 2024-11-06 ENCOUNTER — APPOINTMENT (OUTPATIENT)
Dept: HUMAN REPRODUCTION | Facility: CLINIC | Age: 24
End: 2024-11-06
Payer: COMMERCIAL

## 2024-11-06 PROCEDURE — 36415 COLL VENOUS BLD VENIPUNCTURE: CPT

## 2024-11-19 ENCOUNTER — APPOINTMENT (OUTPATIENT)
Dept: HUMAN REPRODUCTION | Facility: CLINIC | Age: 24
End: 2024-11-19
Payer: COMMERCIAL

## 2024-11-19 PROCEDURE — 36415 COLL VENOUS BLD VENIPUNCTURE: CPT

## 2024-11-19 PROCEDURE — 76830 TRANSVAGINAL US NON-OB: CPT

## 2024-11-19 PROCEDURE — S4042: CPT

## 2024-11-19 PROCEDURE — 99213 OFFICE O/P EST LOW 20 MIN: CPT | Mod: 25

## 2024-11-27 ENCOUNTER — APPOINTMENT (OUTPATIENT)
Dept: HUMAN REPRODUCTION | Facility: CLINIC | Age: 24
End: 2024-11-27
Payer: COMMERCIAL

## 2024-11-27 PROCEDURE — 36415 COLL VENOUS BLD VENIPUNCTURE: CPT

## 2024-11-27 PROCEDURE — 99213 OFFICE O/P EST LOW 20 MIN: CPT | Mod: 25

## 2024-11-27 PROCEDURE — 76857 US EXAM PELVIC LIMITED: CPT

## 2024-12-03 ENCOUNTER — APPOINTMENT (OUTPATIENT)
Dept: HUMAN REPRODUCTION | Facility: CLINIC | Age: 24
End: 2024-12-03
Payer: COMMERCIAL

## 2024-12-03 PROCEDURE — 76857 US EXAM PELVIC LIMITED: CPT

## 2024-12-03 PROCEDURE — 36415 COLL VENOUS BLD VENIPUNCTURE: CPT

## 2024-12-03 PROCEDURE — 99213 OFFICE O/P EST LOW 20 MIN: CPT | Mod: 25

## 2024-12-06 ENCOUNTER — APPOINTMENT (OUTPATIENT)
Dept: HUMAN REPRODUCTION | Facility: CLINIC | Age: 24
End: 2024-12-06
Payer: COMMERCIAL

## 2024-12-06 PROCEDURE — 99213 OFFICE O/P EST LOW 20 MIN: CPT | Mod: 25

## 2024-12-06 PROCEDURE — 36415 COLL VENOUS BLD VENIPUNCTURE: CPT

## 2024-12-06 PROCEDURE — 76857 US EXAM PELVIC LIMITED: CPT

## 2024-12-10 ENCOUNTER — APPOINTMENT (OUTPATIENT)
Dept: HUMAN REPRODUCTION | Facility: CLINIC | Age: 24
End: 2024-12-10
Payer: COMMERCIAL

## 2024-12-10 PROCEDURE — 36415 COLL VENOUS BLD VENIPUNCTURE: CPT

## 2024-12-11 ENCOUNTER — APPOINTMENT (OUTPATIENT)
Dept: HUMAN REPRODUCTION | Facility: CLINIC | Age: 24
End: 2024-12-11
Payer: COMMERCIAL

## 2024-12-11 PROCEDURE — 58974 EMBRYO TRANSFER INTRAUTERINE: CPT

## 2024-12-11 PROCEDURE — 89352 THAWING CRYOPRESRVED EMBRYO: CPT

## 2024-12-11 PROCEDURE — 89398A: CUSTOM

## 2024-12-11 PROCEDURE — 76998 US GUIDE INTRAOP: CPT

## 2024-12-11 PROCEDURE — 89255 PREPARE EMBRYO FOR TRANSFER: CPT

## 2024-12-12 ENCOUNTER — APPOINTMENT (OUTPATIENT)
Dept: HUMAN REPRODUCTION | Facility: CLINIC | Age: 24
End: 2024-12-12
Payer: COMMERCIAL

## 2024-12-23 ENCOUNTER — APPOINTMENT (OUTPATIENT)
Dept: HUMAN REPRODUCTION | Facility: CLINIC | Age: 24
End: 2024-12-23
Payer: COMMERCIAL

## 2024-12-23 PROCEDURE — 36415 COLL VENOUS BLD VENIPUNCTURE: CPT

## 2024-12-27 ENCOUNTER — APPOINTMENT (OUTPATIENT)
Dept: HUMAN REPRODUCTION | Facility: CLINIC | Age: 24
End: 2024-12-27
Payer: COMMERCIAL

## 2024-12-27 PROCEDURE — 36415 COLL VENOUS BLD VENIPUNCTURE: CPT

## 2025-01-03 ENCOUNTER — APPOINTMENT (OUTPATIENT)
Dept: HUMAN REPRODUCTION | Facility: CLINIC | Age: 25
End: 2025-01-03
Payer: SELF-PAY

## 2025-01-03 PROCEDURE — 36415 COLL VENOUS BLD VENIPUNCTURE: CPT

## 2025-01-03 PROCEDURE — 76817 TRANSVAGINAL US OBSTETRIC: CPT

## 2025-01-03 PROCEDURE — 99459 PELVIC EXAMINATION: CPT

## 2025-01-03 PROCEDURE — 99213 OFFICE O/P EST LOW 20 MIN: CPT | Mod: 25

## 2025-01-14 ENCOUNTER — APPOINTMENT (OUTPATIENT)
Dept: HUMAN REPRODUCTION | Facility: CLINIC | Age: 25
End: 2025-01-14
Payer: SELF-PAY

## 2025-01-14 PROCEDURE — 99213 OFFICE O/P EST LOW 20 MIN: CPT | Mod: 25

## 2025-01-14 PROCEDURE — 36415 COLL VENOUS BLD VENIPUNCTURE: CPT

## 2025-01-14 PROCEDURE — 99459 PELVIC EXAMINATION: CPT

## 2025-01-14 PROCEDURE — 76817 TRANSVAGINAL US OBSTETRIC: CPT

## 2025-02-04 ENCOUNTER — APPOINTMENT (OUTPATIENT)
Dept: HUMAN REPRODUCTION | Facility: CLINIC | Age: 25
End: 2025-02-04

## 2025-02-04 PROCEDURE — 99213 OFFICE O/P EST LOW 20 MIN: CPT | Mod: 25

## 2025-02-04 PROCEDURE — 36415 COLL VENOUS BLD VENIPUNCTURE: CPT

## 2025-02-13 ENCOUNTER — APPOINTMENT (OUTPATIENT)
Dept: HUMAN REPRODUCTION | Facility: CLINIC | Age: 25
End: 2025-02-13

## 2025-02-18 ENCOUNTER — APPOINTMENT (OUTPATIENT)
Dept: OBGYN | Facility: CLINIC | Age: 25
End: 2025-02-18
Payer: COMMERCIAL

## 2025-02-18 ENCOUNTER — APPOINTMENT (OUTPATIENT)
Dept: ANTEPARTUM | Facility: CLINIC | Age: 25
End: 2025-02-18

## 2025-02-18 VITALS
BODY MASS INDEX: 21.92 KG/M2 | SYSTOLIC BLOOD PRESSURE: 111 MMHG | HEIGHT: 69 IN | WEIGHT: 148 LBS | DIASTOLIC BLOOD PRESSURE: 74 MMHG

## 2025-02-18 DIAGNOSIS — Z01.419 ENCOUNTER FOR GYNECOLOGICAL EXAMINATION (GENERAL) (ROUTINE) W/OUT ABNORMAL FINDINGS: ICD-10-CM

## 2025-02-18 PROCEDURE — 99395 PREV VISIT EST AGE 18-39: CPT

## 2025-02-18 PROCEDURE — 99459 PELVIC EXAMINATION: CPT

## 2025-02-23 LAB
C TRACH RRNA SPEC QL NAA+PROBE: NOT DETECTED
CYTOLOGY CVX/VAG DOC THIN PREP: NORMAL
N GONORRHOEA RRNA SPEC QL NAA+PROBE: NOT DETECTED
SOURCE AMPLIFICATION: NORMAL

## 2025-02-27 NOTE — OB RN DELIVERY SUMMARY - BABYS CARE PROVIDER NAME, OB PROFILE
Patient's most recent potassium results are listed below.   Recent Labs     02/25/25  0516 02/26/25  0529 02/27/25  0603   K 3.5 2.9* 2.8*       Plan  - Replete potassium per protocol  - Monitor potassium Daily  - Patient's hypokalemia is stable  - Nephrology following.  Appreciate recs.   [place]

## 2025-03-04 ENCOUNTER — ASOB RESULT (OUTPATIENT)
Age: 25
End: 2025-03-04

## 2025-03-04 ENCOUNTER — APPOINTMENT (OUTPATIENT)
Dept: ULTRASOUND IMAGING | Facility: CLINIC | Age: 25
End: 2025-03-04
Payer: COMMERCIAL

## 2025-03-04 ENCOUNTER — APPOINTMENT (OUTPATIENT)
Dept: ANTEPARTUM | Facility: CLINIC | Age: 25
End: 2025-03-04
Payer: COMMERCIAL

## 2025-03-04 ENCOUNTER — RESULT REVIEW (OUTPATIENT)
Age: 25
End: 2025-03-04

## 2025-03-04 ENCOUNTER — APPOINTMENT (OUTPATIENT)
Dept: OBGYN | Facility: CLINIC | Age: 25
End: 2025-03-04

## 2025-03-04 VITALS
BODY MASS INDEX: 22.22 KG/M2 | DIASTOLIC BLOOD PRESSURE: 55 MMHG | SYSTOLIC BLOOD PRESSURE: 127 MMHG | WEIGHT: 150 LBS | HEIGHT: 69 IN

## 2025-03-04 DIAGNOSIS — Z01.419 ENCOUNTER FOR GYNECOLOGICAL EXAMINATION (GENERAL) (ROUTINE) W/OUT ABNORMAL FINDINGS: ICD-10-CM

## 2025-03-04 PROCEDURE — 76857 US EXAM PELVIC LIMITED: CPT

## 2025-03-04 PROCEDURE — 76642 ULTRASOUND BREAST LIMITED: CPT | Mod: 26,LT

## 2025-03-04 PROCEDURE — 76830 TRANSVAGINAL US NON-OB: CPT

## 2025-03-04 PROCEDURE — 99212 OFFICE O/P EST SF 10 MIN: CPT

## 2025-03-09 LAB — HCG SERPL-MCNC: <1 MIU/ML
